# Patient Record
Sex: FEMALE | Race: BLACK OR AFRICAN AMERICAN | NOT HISPANIC OR LATINO | Employment: OTHER | URBAN - METROPOLITAN AREA
[De-identification: names, ages, dates, MRNs, and addresses within clinical notes are randomized per-mention and may not be internally consistent; named-entity substitution may affect disease eponyms.]

---

## 2021-03-30 ENCOUNTER — HISTORICAL (OUTPATIENT)
Dept: ADMINISTRATIVE | Facility: HOSPITAL | Age: 81
End: 2021-03-30

## 2021-03-30 LAB
APPEARANCE, UA: ABNORMAL
BACTERIA SPEC CULT: ABNORMAL /HPF
BILIRUB UR QL STRIP: NEGATIVE
COLOR UR: YELLOW
GLUCOSE (UA): ABNORMAL
HGB UR QL STRIP: NEGATIVE
KETONES UR QL STRIP: NEGATIVE
LEUKOCYTE ESTERASE UR QL STRIP: ABNORMAL
NITRITE UR QL STRIP: NEGATIVE
PH UR STRIP: 7 [PH] (ref 5–9)
PROT UR QL STRIP: ABNORMAL
RBC #/AREA URNS HPF: ABNORMAL /[HPF]
SP GR UR STRIP: 1.02 (ref 1–1.03)
SQUAMOUS EPITHELIAL, UA: ABNORMAL /HPF (ref 0–4)
UROBILINOGEN UR STRIP-ACNC: 0.2
WBC #/AREA URNS HPF: 299 /HPF (ref 0–3)

## 2021-04-01 LAB — FINAL CULTURE: NORMAL

## 2021-04-12 ENCOUNTER — HISTORICAL (OUTPATIENT)
Dept: LAB | Facility: HOSPITAL | Age: 81
End: 2021-04-12

## 2021-04-12 LAB
ABS NEUT (OLG): 6.22 X10(3)/MCL (ref 2.1–9.2)
ALBUMIN SERPL-MCNC: 4 GM/DL (ref 3.4–4.8)
ALBUMIN/GLOB SERPL: 1.3 RATIO (ref 1.1–2)
ALP SERPL-CCNC: 72 UNIT/L (ref 40–150)
ALT SERPL-CCNC: 12 UNIT/L (ref 0–55)
APPEARANCE, UA: ABNORMAL
AST SERPL-CCNC: 10 UNIT/L (ref 5–34)
BACTERIA SPEC CULT: ABNORMAL /HPF
BASOPHILS # BLD AUTO: 0.04 X10(3)/MCL (ref 0–0.2)
BASOPHILS NFR BLD AUTO: 0.5 % (ref 0–1)
BILIRUB SERPL-MCNC: 0.4 MG/DL (ref 0.2–1.2)
BILIRUB UR QL STRIP: NEGATIVE
BILIRUBIN DIRECT+TOT PNL SERPL-MCNC: 0.2 MG/DL (ref 0–0.5)
BILIRUBIN DIRECT+TOT PNL SERPL-MCNC: 0.2 MG/DL (ref 0–0.8)
BUN SERPL-MCNC: 71.1 MG/DL (ref 9.8–20.1)
CALCIUM SERPL-MCNC: 10.5 MG/DL (ref 8.4–10.2)
CHLORIDE SERPL-SCNC: 108 MMOL/L (ref 98–107)
CHOLEST SERPL-MCNC: 207 MG/DL
CHOLEST/HDLC SERPL: 5 {RATIO} (ref 0–5)
CO2 SERPL-SCNC: 24 MMOL/L (ref 23–31)
COLOR UR: YELLOW
CREAT SERPL-MCNC: 3.36 MG/DL (ref 0.57–1.11)
CREAT UR-MCNC: 169.8 MG/DL (ref 45–106)
EOSINOPHIL # BLD AUTO: 0.14 X10(3)/MCL (ref 0–0.9)
EOSINOPHIL NFR BLD AUTO: 1.7 % (ref 0–6.4)
ERYTHROCYTE [DISTWIDTH] IN BLOOD BY AUTOMATED COUNT: 15.1 % (ref 11.5–17)
EST. AVERAGE GLUCOSE BLD GHB EST-MCNC: 177.2 MG/DL
GLOBULIN SER-MCNC: 3 GM/DL (ref 2.4–3.5)
GLUCOSE (UA): NEGATIVE
GLUCOSE SERPL-MCNC: 194 MG/DL (ref 82–115)
HBA1C MFR BLD: 7.8 %
HCT VFR BLD AUTO: 35.1 % (ref 37–47)
HDLC SERPL-MCNC: 44 MG/DL (ref 40–60)
HGB BLD-MCNC: 11.3 GM/DL (ref 12–16)
HGB UR QL STRIP: NEGATIVE
IMM GRANULOCYTES # BLD AUTO: 0.05 10*3/UL (ref 0–0.02)
IMM GRANULOCYTES NFR BLD AUTO: 0.6 % (ref 0–0.43)
KETONES UR QL STRIP: NEGATIVE
LDLC SERPL CALC-MCNC: 129 MG/DL (ref 50–140)
LEUKOCYTE ESTERASE UR QL STRIP: ABNORMAL
LYMPHOCYTES # BLD AUTO: 1.08 X10(3)/MCL (ref 0.6–4.6)
LYMPHOCYTES NFR BLD AUTO: 13.4 % (ref 16–44)
MCH RBC QN AUTO: 28.7 PG (ref 27–31)
MCHC RBC AUTO-ENTMCNC: 32.2 GM/DL (ref 33–36)
MCV RBC AUTO: 89.1 FL (ref 80–94)
MICROALBUMIN UR-MCNC: 51.4 UG/ML
MICROALBUMIN/CREAT RATIO PNL UR: 30.3 MG/GM CR (ref 0–30)
MONOCYTES # BLD AUTO: 0.51 X10(3)/MCL (ref 0.1–1.3)
MONOCYTES NFR BLD AUTO: 6.3 % (ref 4–12.1)
NEUTROPHILS # BLD AUTO: 6.22 X10(3)/MCL (ref 2.1–9.2)
NEUTROPHILS NFR BLD AUTO: 77.5 % (ref 43–73)
NITRITE UR QL STRIP: NEGATIVE
NRBC BLD AUTO-RTO: 0 % (ref 0–0.2)
PH UR STRIP: 6 [PH] (ref 5–7)
PLATELET # BLD AUTO: 289 X10(3)/MCL (ref 130–400)
PMV BLD AUTO: 10.2 FL (ref 7.4–10.4)
POTASSIUM SERPL-SCNC: 5.2 MMOL/L (ref 3.5–5.1)
PROT SERPL-MCNC: 7 GM/DL (ref 5.8–7.6)
PROT UR QL STRIP: NEGATIVE
RBC # BLD AUTO: 3.94 X10(6)/MCL (ref 4.2–5.4)
RBC #/AREA URNS HPF: 0 /[HPF]
SODIUM SERPL-SCNC: 141 MMOL/L (ref 136–145)
SP GR UR STRIP: 1.02 (ref 1–1.03)
SQUAMOUS EPITHELIAL, UA: ABNORMAL /LPF
TRIGL SERPL-MCNC: 169 MG/DL (ref 0–150)
TSH SERPL-ACNC: 4.52 UIU/ML (ref 0.35–4.94)
UROBILINOGEN UR STRIP-ACNC: NEGATIVE
VLDLC SERPL CALC-MCNC: 34 MG/DL
WBC # SPEC AUTO: 8 X10(3)/MCL (ref 4.5–11.5)
WBC #/AREA URNS HPF: ABNORMAL /HPF

## 2021-04-21 ENCOUNTER — HISTORICAL (OUTPATIENT)
Dept: RADIOLOGY | Facility: HOSPITAL | Age: 81
End: 2021-04-21

## 2021-12-28 ENCOUNTER — HISTORICAL (OUTPATIENT)
Dept: LAB | Facility: HOSPITAL | Age: 81
End: 2021-12-28

## 2021-12-28 LAB
ABS NEUT (OLG): 6.63 X10(3)/MCL (ref 2.1–9.2)
ALBUMIN SERPL-MCNC: 3.8 GM/DL (ref 3.4–4.8)
ALBUMIN/GLOB SERPL: 1.3 RATIO (ref 1.1–2)
ALP SERPL-CCNC: 57 UNIT/L (ref 40–150)
ALT SERPL-CCNC: 14 UNIT/L (ref 0–55)
APPEARANCE, UA: ABNORMAL
AST SERPL-CCNC: 11 UNIT/L (ref 5–34)
BACTERIA SPEC CULT: ABNORMAL /HPF
BASOPHILS # BLD AUTO: 0.03 X10(3)/MCL (ref 0–0.2)
BASOPHILS NFR BLD AUTO: 0.3 % (ref 0–1)
BILIRUB SERPL-MCNC: 0.4 MG/DL (ref 0.2–1.2)
BILIRUB UR QL STRIP: NEGATIVE
BILIRUBIN DIRECT+TOT PNL SERPL-MCNC: 0.2 MG/DL (ref 0–0.5)
BILIRUBIN DIRECT+TOT PNL SERPL-MCNC: 0.2 MG/DL (ref 0–0.8)
BUN SERPL-MCNC: 48.2 MG/DL (ref 9.8–20.1)
CALCIUM SERPL-MCNC: 10.2 MG/DL (ref 8.4–10.2)
CHLORIDE SERPL-SCNC: 108 MMOL/L (ref 98–107)
CHOLEST SERPL-MCNC: 192 MG/DL
CHOLEST/HDLC SERPL: 4 {RATIO} (ref 0–5)
CO2 SERPL-SCNC: 26 MMOL/L (ref 23–31)
COLOR UR: YELLOW
CREAT SERPL-MCNC: 2.34 MG/DL (ref 0.57–1.11)
EOSINOPHIL # BLD AUTO: 0.11 X10(3)/MCL (ref 0–0.9)
EOSINOPHIL NFR BLD AUTO: 1.3 % (ref 0–6.4)
ERYTHROCYTE [DISTWIDTH] IN BLOOD BY AUTOMATED COUNT: 15.9 % (ref 11.5–17)
EST. AVERAGE GLUCOSE BLD GHB EST-MCNC: 159.9 MG/DL
GLOBULIN SER-MCNC: 3 GM/DL (ref 2.4–3.5)
GLUCOSE (UA): NEGATIVE
GLUCOSE SERPL-MCNC: 156 MG/DL (ref 82–115)
HBA1C MFR BLD: 7.2 %
HCT VFR BLD AUTO: 33.7 % (ref 37–47)
HDLC SERPL-MCNC: 45 MG/DL (ref 40–60)
HGB BLD-MCNC: 10.6 GM/DL (ref 12–16)
HGB UR QL STRIP: NEGATIVE
IMM GRANULOCYTES # BLD AUTO: 0.05 10*3/UL (ref 0–0.02)
IMM GRANULOCYTES NFR BLD AUTO: 0.6 % (ref 0–0.43)
KETONES UR QL STRIP: NEGATIVE
LDLC SERPL CALC-MCNC: 127 MG/DL (ref 50–140)
LEUKOCYTE ESTERASE UR QL STRIP: ABNORMAL
LYMPHOCYTES # BLD AUTO: 1.22 X10(3)/MCL (ref 0.6–4.6)
LYMPHOCYTES NFR BLD AUTO: 14.2 % (ref 16–44)
MCH RBC QN AUTO: 28.6 PG (ref 27–31)
MCHC RBC AUTO-ENTMCNC: 31.5 GM/DL (ref 33–36)
MCV RBC AUTO: 90.8 FL (ref 80–94)
MONOCYTES # BLD AUTO: 0.57 X10(3)/MCL (ref 0.1–1.3)
MONOCYTES NFR BLD AUTO: 6.6 % (ref 4–12.1)
NEUTROPHILS # BLD AUTO: 6.63 X10(3)/MCL (ref 2.1–9.2)
NEUTROPHILS NFR BLD AUTO: 77 % (ref 43–73)
NITRITE UR QL STRIP: NEGATIVE
NRBC BLD AUTO-RTO: 0 % (ref 0–0.2)
PH UR STRIP: 5.5 [PH] (ref 5–7)
PLATELET # BLD AUTO: 270 X10(3)/MCL (ref 130–400)
PMV BLD AUTO: 10.8 FL (ref 7.4–10.4)
POTASSIUM SERPL-SCNC: 4.6 MMOL/L (ref 3.5–5.1)
PROT SERPL-MCNC: 6.8 GM/DL (ref 5.8–7.6)
PROT UR QL STRIP: NEGATIVE
RBC # BLD AUTO: 3.71 X10(6)/MCL (ref 4.2–5.4)
RBC #/AREA URNS HPF: 0 /[HPF]
SODIUM SERPL-SCNC: 143 MMOL/L (ref 136–145)
SP GR UR STRIP: 1.02 (ref 1–1.03)
SQUAMOUS EPITHELIAL, UA: ABNORMAL /LPF
TRIGL SERPL-MCNC: 102 MG/DL (ref 0–150)
UROBILINOGEN UR STRIP-ACNC: NEGATIVE
VLDLC SERPL CALC-MCNC: 20 MG/DL
WBC # SPEC AUTO: 8.6 X10(3)/MCL (ref 4.5–11.5)
WBC #/AREA URNS HPF: ABNORMAL /HPF

## 2022-04-11 ENCOUNTER — HISTORICAL (OUTPATIENT)
Dept: ADMINISTRATIVE | Facility: HOSPITAL | Age: 82
End: 2022-04-11

## 2022-04-29 VITALS
HEIGHT: 57 IN | OXYGEN SATURATION: 97 % | WEIGHT: 216 LBS | DIASTOLIC BLOOD PRESSURE: 79 MMHG | SYSTOLIC BLOOD PRESSURE: 138 MMHG | BODY MASS INDEX: 46.6 KG/M2

## 2022-06-16 ENCOUNTER — OFFICE VISIT (OUTPATIENT)
Dept: FAMILY MEDICINE | Facility: CLINIC | Age: 82
End: 2022-06-16
Payer: MEDICARE

## 2022-06-16 VITALS
SYSTOLIC BLOOD PRESSURE: 107 MMHG | DIASTOLIC BLOOD PRESSURE: 67 MMHG | HEART RATE: 84 BPM | OXYGEN SATURATION: 99 % | RESPIRATION RATE: 18 BRPM | BODY MASS INDEX: 45.74 KG/M2 | HEIGHT: 57 IN | TEMPERATURE: 99 F | WEIGHT: 212 LBS

## 2022-06-16 DIAGNOSIS — Z74.8 DEPENDENT FOR TRANSPORTATION: ICD-10-CM

## 2022-06-16 DIAGNOSIS — E11.69 MIXED DIABETIC HYPERLIPIDEMIA ASSOCIATED WITH TYPE 2 DIABETES MELLITUS: Chronic | ICD-10-CM

## 2022-06-16 DIAGNOSIS — E03.9 ACQUIRED HYPOTHYROIDISM: Chronic | ICD-10-CM

## 2022-06-16 DIAGNOSIS — E11.65 TYPE 2 DIABETES MELLITUS WITH HYPERGLYCEMIA, WITH LONG-TERM CURRENT USE OF INSULIN: Primary | ICD-10-CM

## 2022-06-16 DIAGNOSIS — E78.2 MIXED DIABETIC HYPERLIPIDEMIA ASSOCIATED WITH TYPE 2 DIABETES MELLITUS: Chronic | ICD-10-CM

## 2022-06-16 DIAGNOSIS — M15.9 PRIMARY OSTEOARTHRITIS INVOLVING MULTIPLE JOINTS: ICD-10-CM

## 2022-06-16 DIAGNOSIS — Z79.4 TYPE 2 DIABETES MELLITUS WITH HYPERGLYCEMIA, WITH LONG-TERM CURRENT USE OF INSULIN: Primary | ICD-10-CM

## 2022-06-16 DIAGNOSIS — N18.4 CHRONIC KIDNEY DISEASE, STAGE 4 (SEVERE): ICD-10-CM

## 2022-06-16 DIAGNOSIS — Z79.4 LONG TERM CURRENT USE OF INSULIN: Chronic | ICD-10-CM

## 2022-06-16 DIAGNOSIS — E66.01 MORBID OBESITY: ICD-10-CM

## 2022-06-16 DIAGNOSIS — E11.22 TYPE 2 DIABETES MELLITUS WITH STAGE 4 CHRONIC KIDNEY DISEASE, WITH LONG-TERM CURRENT USE OF INSULIN: Chronic | ICD-10-CM

## 2022-06-16 DIAGNOSIS — Z79.4 TYPE 2 DIABETES MELLITUS WITH STAGE 4 CHRONIC KIDNEY DISEASE, WITH LONG-TERM CURRENT USE OF INSULIN: Chronic | ICD-10-CM

## 2022-06-16 DIAGNOSIS — N18.4 TYPE 2 DIABETES MELLITUS WITH STAGE 4 CHRONIC KIDNEY DISEASE, WITH LONG-TERM CURRENT USE OF INSULIN: Chronic | ICD-10-CM

## 2022-06-16 DIAGNOSIS — I10 PRIMARY HYPERTENSION: Chronic | ICD-10-CM

## 2022-06-16 DIAGNOSIS — E78.2 MIXED HYPERLIPIDEMIA: Chronic | ICD-10-CM

## 2022-06-16 DIAGNOSIS — Z99.89 DEPENDENT ON WALKER FOR AMBULATION: ICD-10-CM

## 2022-06-16 PROBLEM — Z00.01 ABNORMAL PHYSICAL EVALUATION: Status: ACTIVE | Noted: 2022-06-16

## 2022-06-16 PROBLEM — M19.90 OSTEOARTHRITIS: Status: ACTIVE | Noted: 2022-06-16

## 2022-06-16 PROBLEM — Z23 NEED FOR IMMUNIZATION AGAINST INFLUENZA: Status: ACTIVE | Noted: 2022-06-16

## 2022-06-16 PROBLEM — Z13.820 OSTEOPOROSIS SCREENING: Status: ACTIVE | Noted: 2022-06-16

## 2022-06-16 PROCEDURE — 99214 PR OFFICE/OUTPT VISIT, EST, LEVL IV, 30-39 MIN: ICD-10-PCS | Mod: ,,, | Performed by: FAMILY MEDICINE

## 2022-06-16 PROCEDURE — 99214 OFFICE O/P EST MOD 30 MIN: CPT | Mod: ,,, | Performed by: FAMILY MEDICINE

## 2022-06-16 RX ORDER — PEN NEEDLE, DIABETIC 32GX 5/32"
NEEDLE, DISPOSABLE MISCELLANEOUS
COMMUNITY
Start: 2022-05-18

## 2022-06-16 RX ORDER — CALCITRIOL 0.25 UG/1
0.25 CAPSULE ORAL DAILY
COMMUNITY
Start: 2022-05-19

## 2022-06-16 RX ORDER — TRAMADOL HYDROCHLORIDE 50 MG/1
50 TABLET ORAL EVERY 12 HOURS
COMMUNITY
Start: 2022-02-08 | End: 2022-08-07

## 2022-06-16 RX ORDER — INSULIN DEGLUDEC 100 U/ML
30 INJECTION, SOLUTION SUBCUTANEOUS DAILY
COMMUNITY
Start: 2021-12-06 | End: 2022-09-20

## 2022-06-16 RX ORDER — METHIMAZOLE 10 MG/1
TABLET ORAL
COMMUNITY
Start: 2022-04-03

## 2022-06-16 RX ORDER — GLIMEPIRIDE 4 MG/1
4 TABLET ORAL 2 TIMES DAILY
COMMUNITY
Start: 2022-05-24 | End: 2022-06-16

## 2022-06-16 RX ORDER — DULAGLUTIDE 1.5 MG/.5ML
1.5 INJECTION, SOLUTION SUBCUTANEOUS WEEKLY
COMMUNITY
Start: 2022-06-03 | End: 2023-03-23 | Stop reason: SDUPTHER

## 2022-06-16 RX ORDER — PRAVASTATIN SODIUM 20 MG/1
20 TABLET ORAL DAILY
COMMUNITY
Start: 2022-05-19

## 2022-06-16 RX ORDER — LISINOPRIL AND HYDROCHLOROTHIAZIDE 10; 12.5 MG/1; MG/1
1 TABLET ORAL DAILY
COMMUNITY
Start: 2022-03-06 | End: 2022-06-16 | Stop reason: SDUPTHER

## 2022-06-16 RX ORDER — DOCUSATE SODIUM 100 MG/1
1 CAPSULE, LIQUID FILLED ORAL DAILY
COMMUNITY
Start: 2021-12-06

## 2022-06-16 RX ORDER — LISINOPRIL AND HYDROCHLOROTHIAZIDE 10; 12.5 MG/1; MG/1
1 TABLET ORAL DAILY
Qty: 90 TABLET | Refills: 1 | Status: SHIPPED | OUTPATIENT
Start: 2022-06-16 | End: 2022-09-20

## 2022-06-16 RX ORDER — AMLODIPINE BESYLATE 10 MG/1
10 TABLET ORAL DAILY
COMMUNITY
Start: 2022-05-19 | End: 2023-03-23 | Stop reason: SDUPTHER

## 2022-06-16 RX ORDER — LABETALOL 300 MG/1
300 TABLET, FILM COATED ORAL 2 TIMES DAILY
COMMUNITY
Start: 2021-12-06

## 2022-06-16 NOTE — PROGRESS NOTES
Subjective:      Patient ID: Anne Alberts is a 82 y.o. female.    Chief Complaint: Follow-up (Needs med refills. Had some episodes hypoglycemia requiring emergency care via EMS giving IV Dextrose. //New med from Dr. Maddy Pastor. (Before episodes of hypoglyemia.))    Problem List Items Addressed This Visit     Type 2 diabetes mellitus with hyperglycemia, with long-term current use of insulin - Primary (Chronic)    Relevant Medications    TRULICITY 1.5 mg/0.5 mL pen injector    glimepiride (AMARYL) 4 MG tablet    insulin degludec (TRESIBA FLEXTOUCH U-100) 100 unit/mL (3 mL) insulin pen    Chronic kidney disease, stage 4 (severe) (Chronic)    Hypertension (Chronic)    Long term current use of insulin (Chronic)    Mixed hyperlipidemia (Chronic)    Type 2 diabetes mellitus with stage 4 chronic kidney disease, with long-term current use of insulin (Chronic)    Relevant Medications    TRULICITY 1.5 mg/0.5 mL pen injector    glimepiride (AMARYL) 4 MG tablet    insulin degludec (TRESIBA FLEXTOUCH U-100) 100 unit/mL (3 mL) insulin pen    BMI 45.0-49.9, adult (Chronic)    Mixed diabetic hyperlipidemia associated with type 2 diabetes mellitus (Chronic)    Relevant Medications    TRULICITY 1.5 mg/0.5 mL pen injector    glimepiride (AMARYL) 4 MG tablet    insulin degludec (TRESIBA FLEXTOUCH U-100) 100 unit/mL (3 mL) insulin pen    Hypothyroidism (Chronic)    Dependent on walker for ambulation    Morbid obesity    Osteoarthritis      Other Visit Diagnoses     Dependent for transportation              The patient's Health Maintenance was reviewed and the following appears to be due:   Health Maintenance Due   Topic Date Due    Foot Exam  Never done    TETANUS VACCINE  Never done    Eye Exam  08/23/2019    Shingles Vaccine (2 of 2) 09/15/2019    COVID-19 Vaccine (4 - Booster for Pfizer series) 02/15/2022    Diabetes Urine Screening  04/12/2022       Past Medical History:  Past Medical History:   Diagnosis Date     "Diabetes mellitus, type 2     Disorder of kidney and ureter     Hypertension     Hypothyroidism      Past Surgical History:   Procedure Laterality Date    APPENDECTOMY      BREAST BIOPSY       SECTION       Review of patient's allergies indicates:   Allergen Reactions    Aspirin      Other reaction(s): Bleeding    Codeine Rash     Other reaction(s): Codeine     Current Outpatient Medications on File Prior to Visit   Medication Sig Dispense Refill    docusate sodium (COLACE) 100 MG capsule Take 1 capsule by mouth once daily.      insulin degludec (TRESIBA FLEXTOUCH U-100) 100 unit/mL (3 mL) insulin pen Inject 30 Units into the skin once daily.      labetaloL (NORMODYNE) 300 MG tablet Take 300 mg by mouth 2 (two) times a day.      traMADoL (ULTRAM) 50 mg tablet Take 50 mg by mouth every 12 (twelve) hours.      amLODIPine (NORVASC) 10 MG tablet Take 10 mg by mouth once daily.      BD BARBIE 2ND GEN PEN NEEDLE 32 gauge x 5/32" Ndle USE ONE NEEDLE SUBCUTANEOUSLY ONCE DAILY TO ADMINISTER TRESIBA 90      calcitRIOL (ROCALTROL) 0.25 MCG Cap Take 0.25 mcg by mouth once daily.      glimepiride (AMARYL) 4 MG tablet Take 4 mg by mouth 2 (two) times daily.      lisinopriL-hydrochlorothiazide (PRINZIDE,ZESTORETIC) 10-12.5 mg per tablet Take 1 tablet by mouth once daily.      methIMAzole (TAPAZOLE) 10 MG Tab TAKE 1 TABLET BY MOUTH WITH FOOD EVERY OTHER DAY ALTERNATING WITH 1/2 TABLET      pravastatin (PRAVACHOL) 20 MG tablet Take 20 mg by mouth once daily.      TRULICITY 1.5 mg/0.5 mL pen injector Inject 1.5 mg into the skin once a week.       No current facility-administered medications on file prior to visit.     Social History     Socioeconomic History    Marital status:     Number of children: 3   Occupational History    Occupation: Retired   Tobacco Use    Smoking status: Former Smoker    Smokeless tobacco: Never Used   Substance and Sexual Activity    Alcohol use: Not Currently    Drug " "use: Not Currently     Social Determinants of Health     Transportation Needs: Unmet Transportation Needs    Lack of Transportation (Medical): Yes    Lack of Transportation (Non-Medical): Yes     Family History   Problem Relation Age of Onset    Heart disease Mother     Kidney disease Father     Stroke Father     Hypertension Father     Diabetes Father        Review of Systems    Objective:   /67 (BP Location: Right arm, Patient Position: Sitting, BP Method: X-Large (Automatic))   Pulse 84   Temp 98.8 °F (37.1 °C) (Oral)   Resp 18   Ht 4' 9" (1.448 m)   Wt 96.2 kg (212 lb)   SpO2 99%   BMI 45.88 kg/m²     Physical Exam  Vitals and nursing note reviewed.   Constitutional:       Appearance: Normal appearance. She is obese.   HENT:      Head: Normocephalic and atraumatic.      Right Ear: Tympanic membrane, ear canal and external ear normal.      Left Ear: Tympanic membrane, ear canal and external ear normal.      Nose: Nose normal.      Mouth/Throat:      Mouth: Mucous membranes are moist.      Pharynx: Oropharynx is clear.   Eyes:      Extraocular Movements: Extraocular movements intact.      Conjunctiva/sclera: Conjunctivae normal.      Pupils: Pupils are equal, round, and reactive to light.   Cardiovascular:      Rate and Rhythm: Normal rate and regular rhythm.      Pulses:           Dorsalis pedis pulses are 1+ on the right side and 1+ on the left side.        Posterior tibial pulses are 1+ on the right side and 1+ on the left side.      Heart sounds: Normal heart sounds.   Pulmonary:      Effort: Pulmonary effort is normal.      Breath sounds: Normal breath sounds.   Abdominal:      General: Abdomen is flat. Bowel sounds are normal.      Palpations: Abdomen is soft.   Musculoskeletal:         General: Normal range of motion.      Cervical back: Normal range of motion and neck supple.      Right foot: No deformity.      Left foot: No deformity.      Comments: Ambulates with help of rollator " walker   Feet:      Right foot:      Protective Sensation: 6 sites tested. 6 sites sensed.      Skin integrity: Skin integrity normal.      Toenail Condition: Right toenails are normal.      Left foot:      Protective Sensation: 6 sites tested. 6 sites sensed.      Skin integrity: Skin integrity normal.      Toenail Condition: Left toenails are normal.   Skin:     General: Skin is warm and dry.      Capillary Refill: Capillary refill takes 2 to 3 seconds.   Neurological:      General: No focal deficit present.      Mental Status: She is alert and oriented to person, place, and time. Mental status is at baseline.   Psychiatric:         Mood and Affect: Mood normal.         Behavior: Behavior normal.         Thought Content: Thought content normal.         Judgment: Judgment normal.         Historical on 12/28/2021   Component Date Value Ref Range Status    Alanine Aminotransferase 12/28/2021 14  0 - 55 unit/L Final    Albumin/Globulin Ratio 12/28/2021 1.3  1.1 - 2.0 ratio Final    Aspartate Aminotransferase 12/28/2021 11  5 - 34 unit/L Final    Albumin Level 12/28/2021 3.8  3.4 - 4.8 gm/dL Final    Alkaline Phosphatase 12/28/2021 57  40 - 150 unit/L Final    Blood Urea Nitrogen 12/28/2021 48.2 (A) 9.8 - 20.1 mg/dL Final    Bilirubin Indirect 12/28/2021 0.20  0.00 - 0.80 mg/dL Final    Bilirubin Direct 12/28/2021 0.2  0.0 - 0.5 mg/dL Final    Bilirubin Total 12/28/2021 0.4  0.2 - 1.2 mg/dL Final    Calcium Level Total 12/28/2021 10.2  8.4 - 10.2 mg/dL Final    Carbon Dioxide 12/28/2021 26  23 - 31 mmol/L Final    Chloride 12/28/2021 108 (A) 98 - 107 mmol/L Final    Creatinine 12/28/2021 2.34 (A) 0.57 - 1.11 mg/dL Final    Glucose Level 12/28/2021 156 (A) 82 - 115 mg/dL Final    Globulin 12/28/2021 3.0  2.4 - 3.5 gm/dL Final    Potassium Level 12/28/2021 4.6  3.5 - 5.1 mmol/L Final    Sodium Level 12/28/2021 143  136 - 145 mmol/L Final    Protein Total 12/28/2021 6.8  5.8 - 7.6 gm/dL Final     Cholesterol Total 12/28/2021 192  <<=200 mg/dL Final    Cholesterol/HDL Ratio 12/28/2021 4  0 - 5 Final    HDL Cholesterol 12/28/2021 45  40 - 60 mg/dL Final    LDL Cholesterol 12/28/2021 127.00  50.00 - 140.00 mg/dL Final    Triglyceride 12/28/2021 102  0 - 150 mg/dL Final    Very Low Density Lipoprotein 12/28/2021 20   Final    Estimated Average Glucose 12/28/2021 159.9  mg/dL Final    Hemoglobin A1c 12/28/2021 7.2 (A) <<=7.0 % Final    Estimated GFR-Non  12/28/2021 21  mL/min/1.73 m2 Final    Estimated GFR- 12/28/2021 26   Final    Lymph # 12/28/2021 1.22  0.60 - 4.60 x10(3)/mcL Final    Basophil % 12/28/2021 0.3  0.0 - 1.0 % Final    Mono # 12/28/2021 0.57  0.10 - 1.30 x10(3)/mcL Final    Baso # 12/28/2021 0.03  0.00 - 0.20 x10(3)/mcL Final    Eos # 12/28/2021 0.11  0.00 - 0.90 x10(3)/mcL Final    Neut # 12/28/2021 6.63  2.10 - 9.20 x10(3)/mcL Final    Lymph % 12/28/2021 14.2 (A) 16.0 - 44.0 % Final    Eos % 12/28/2021 1.3  0.0 - 6.4 % Final    Mono % 12/28/2021 6.6  4.0 - 12.1 % Final    IG# 12/28/2021 0.0500 (A) 0.0000 - 0.0155 Final    IG% 12/28/2021 0.600 (A) 0.000 - 0.430 % Final    Neut % 12/28/2021 77.0 (A) 43.0 - 73.0 % Final    NRBC% 12/28/2021 0.0  0.0 - 0.2 % Final    Abs Neut 12/28/2021 6.63  2.10 - 9.20 x10(3)/mcL Final    Hgb 12/28/2021 10.6 (A) 12.0 - 16.0 gm/dL Final    Hct 12/28/2021 33.7 (A) 37.0 - 47.0 % Final    MCV 12/28/2021 90.8  80.0 - 94.0 fL Final    MPV 12/28/2021 10.8 (A) 7.4 - 10.4 fL Final    MCH 12/28/2021 28.6  27.0 - 31.0 pg Final    MCHC 12/28/2021 31.5 (A) 33.0 - 36.0 gm/dL Final    RDW 12/28/2021 15.9  11.5 - 17.0 % Final    Platelet 12/28/2021 270  130 - 400 x10(3)/mcL Final    WBC 12/28/2021 8.6  4.5 - 11.5 x10(3)/mcL Final    RBC 12/28/2021 3.71 (A) 4.20 - 5.40 x10(6)/mcL Final    Nitrite, UA 12/28/2021 Negative  >Negative Final    Occult Blood UA 12/28/2021 Negative  >Negative Final    Protein, UA  12/28/2021 Negative  >Negative Final    Bilirubin (UA) 12/28/2021 Negative  >Negative Final    pH, UA 12/28/2021 5.5  5.0 - 7.0 Final    Glucose, UA 12/28/2021 Negative  >Negative Final    Ketones, UA 12/28/2021 Negative  >Negative Final    Color, UA 12/28/2021 YELLOW  >YELLOW Final    Leukocytes, UA 12/28/2021 1+ (A) >Negative Final    Appearance, UA 12/28/2021 HAZY  >CLEAR Final    Urobilinogen, UA 12/28/2021 Negative  >Negative Final    Specific Gravity,UA 12/28/2021 1.020  1.005 - 1.030 Final    WBC, UA 12/28/2021 20-50 (A) >2 - 5 /HPF Final    RBC, UA 12/28/2021 0  >2 - 5 Final    Bacteria 12/28/2021 Moderate (A) >None Seen /HPF Final    Squam Epithel, UA 12/28/2021 Few  /LPF Final       X-Ray Chest PA And Lateral     EXAM: XR Chest 1 View     INDICATION: Shortness of Breath     TECHNIQUE: Frontal view of the chest is obtained.     COMPARISON: None     FINDINGS: The cardiomediastinal silhouette is normal in size and  contour. The thoracic aorta is atherosclerotic. There is mild  elevation of the left hemidiaphragm without focal consolidation,  pleural effusion or pneumothorax.        IMPRESSION:   No acute cardiopulmonary process identified.        Electronically Signed By: Anushka Reeves DO  Date/Time Signed: 10/26/2018 09:00       Assessment:     1. Type 2 diabetes mellitus with hyperglycemia, with long-term current use of insulin    2. Type 2 diabetes mellitus with stage 4 chronic kidney disease, with long-term current use of insulin    3. Mixed diabetic hyperlipidemia associated with type 2 diabetes mellitus    4. Long term current use of insulin    5. Mixed hyperlipidemia    6. Primary hypertension    7. Chronic kidney disease, stage 4 (severe)    8. Primary osteoarthritis involving multiple joints    9. Acquired hypothyroidism    10. Morbid obesity    11. BMI 45.0-49.9, adult    12. Dependent for transportation    13. Dependent on walker for ambulation      Plan:   I am having Anne NORIEGA  Aristeo maintain her amLODIPine, calcitRIOL, TRULICITY, docusate sodium, glimepiride, TRESIBA FLEXTOUCH U-100, labetaloL, lisinopriL-hydrochlorothiazide, methIMAzole, BD BARBIE 2ND GEN PEN NEEDLE, pravastatin, and traMADoL.  Problem List Items Addressed This Visit     Type 2 diabetes mellitus with hyperglycemia, with long-term current use of insulin - Primary (Chronic)    Relevant Medications    TRULICITY 1.5 mg/0.5 mL pen injector    glimepiride (AMARYL) 4 MG tablet    insulin degludec (TRESIBA FLEXTOUCH U-100) 100 unit/mL (3 mL) insulin pen    Chronic kidney disease, stage 4 (severe) (Chronic)    Hypertension (Chronic)    Long term current use of insulin (Chronic)    Mixed hyperlipidemia (Chronic)    Type 2 diabetes mellitus with stage 4 chronic kidney disease, with long-term current use of insulin (Chronic)    Relevant Medications    TRULICITY 1.5 mg/0.5 mL pen injector    glimepiride (AMARYL) 4 MG tablet    insulin degludec (TRESIBA FLEXTOUCH U-100) 100 unit/mL (3 mL) insulin pen    BMI 45.0-49.9, adult (Chronic)    Mixed diabetic hyperlipidemia associated with type 2 diabetes mellitus (Chronic)    Relevant Medications    TRULICITY 1.5 mg/0.5 mL pen injector    glimepiride (AMARYL) 4 MG tablet    insulin degludec (TRESIBA FLEXTOUCH U-100) 100 unit/mL (3 mL) insulin pen    Hypothyroidism (Chronic)    Dependent on walker for ambulation    Morbid obesity    Osteoarthritis      Other Visit Diagnoses     Dependent for transportation            Follow up in about 6 months (around 12/16/2022).    Anne was seen today for follow-up.    Diagnoses and all orders for this visit:    Type 2 diabetes mellitus with hyperglycemia, with long-term current use of insulin   Continue current prescription medications. Refills as needed   Condition/Symptoms controlled, Sees endocrine for this. Request records for our metrics   RTC 6 months (as scheduled) or PRN    Type 2 diabetes mellitus with stage 4 chronic kidney disease, with  long-term current use of insulin   Continue current prescription medications. Refills as needed   Condition/Symptoms controlled, Sees endocrine for this. Request records for our metrics   RTC 6 months (as scheduled) or PRN    Mixed diabetic hyperlipidemia associated with type 2 diabetes mellitus   Continue current prescription medications. Refills as needed   Condition/Symptoms controlled, Sees endocrine for this. Request records for our metrics   RTC 6 months (as scheduled) or PRN    Long term current use of insulin   Continue current prescription medications. Refills as needed   Condition/Symptoms controlled, Sees endocrine for this. Request records for our metrics   RTC 6 months (as scheduled) or PRN    Mixed hyperlipidemia   Continue current prescription medications. Refills as needed   Condition/Symptoms controlled, Sees endocrine for this. Request records for our metrics   RTC 6 months (as scheduled) or PRN      Primary hypertension  -     lisinopriL-hydrochlorothiazide (PRINZIDE,ZESTORETIC) 10-12.5 mg per tablet; Take 1 tablet by mouth once daily.  - Continue current prescription medications. Refills as needed   Condition/Symptoms controlled   RTC 6 months (as scheduled) or PRN    Chronic kidney disease, stage 4 (severe)   Continue current prescription medications. Refills as needed   Condition/Symptoms controlled, Sees endocrine for this. Request records for our metrics   RTC 6 months (as scheduled) or PRN    Primary osteoarthritis involving multiple joints   Continue current prescription medications. Refills as needed   Condition/Symptoms controlled, Sees endocrine for this. Request records for our metrics   RTC 6 months (as scheduled) or PRN    Acquired hypothyroidism   Continue current prescription medications. Refills as needed   Condition/Symptoms controlled, Sees endocrine for this. Request records for our metrics   RTC 6 months (as scheduled) or PRN    Morbid obesity  Documented for chart completeness  "and HCC    BMI 45.0-49.9, adult  Documented for chart completeness and HCC    Dependent for transportation  Documented for chart completeness and HCC    Dependent on walker for ambulation  Documented for chart completeness and HCC           [unfilled]  No orders of the defined types were placed in this encounter.      Medication List with Changes/Refills   Current Medications    AMLODIPINE (NORVASC) 10 MG TABLET    Take 10 mg by mouth once daily.    BD BARBIE 2ND GEN PEN NEEDLE 32 GAUGE X 5/32" NDLE    USE ONE NEEDLE SUBCUTANEOUSLY ONCE DAILY TO ADMINISTER TRESIBA 90    CALCITRIOL (ROCALTROL) 0.25 MCG CAP    Take 0.25 mcg by mouth once daily.    DOCUSATE SODIUM (COLACE) 100 MG CAPSULE    Take 1 capsule by mouth once daily.    GLIMEPIRIDE (AMARYL) 4 MG TABLET    Take 4 mg by mouth 2 (two) times daily.    INSULIN DEGLUDEC (TRESIBA FLEXTOUCH U-100) 100 UNIT/ML (3 ML) INSULIN PEN    Inject 30 Units into the skin once daily.    LABETALOL (NORMODYNE) 300 MG TABLET    Take 300 mg by mouth 2 (two) times a day.    LISINOPRIL-HYDROCHLOROTHIAZIDE (PRINZIDE,ZESTORETIC) 10-12.5 MG PER TABLET    Take 1 tablet by mouth once daily.    METHIMAZOLE (TAPAZOLE) 10 MG TAB    TAKE 1 TABLET BY MOUTH WITH FOOD EVERY OTHER DAY ALTERNATING WITH 1/2 TABLET    PRAVASTATIN (PRAVACHOL) 20 MG TABLET    Take 20 mg by mouth once daily.    TRAMADOL (ULTRAM) 50 MG TABLET    Take 50 mg by mouth every 12 (twelve) hours.    TRULICITY 1.5 MG/0.5 ML PEN INJECTOR    Inject 1.5 mg into the skin once a week.      Medication List with Changes/Refills   Current Medications    AMLODIPINE (NORVASC) 10 MG TABLET    Take 10 mg by mouth once daily.       Start Date: 5/19/2022 End Date: --    BD BARBIE 2ND GEN PEN NEEDLE 32 GAUGE X 5/32" NDLE    USE ONE NEEDLE SUBCUTANEOUSLY ONCE DAILY TO ADMINISTER TRESIBA 90       Start Date: 5/18/2022 End Date: --    CALCITRIOL (ROCALTROL) 0.25 MCG CAP    Take 0.25 mcg by mouth once daily.       Start Date: 5/19/2022 End Date: --    " DOCUSATE SODIUM (COLACE) 100 MG CAPSULE    Take 1 capsule by mouth once daily.       Start Date: 12/6/2021 End Date: --    GLIMEPIRIDE (AMARYL) 4 MG TABLET    Take 4 mg by mouth 2 (two) times daily.       Start Date: 5/24/2022 End Date: --    INSULIN DEGLUDEC (TRESIBA FLEXTOUCH U-100) 100 UNIT/ML (3 ML) INSULIN PEN    Inject 30 Units into the skin once daily.       Start Date: 12/6/2021 End Date: --    LABETALOL (NORMODYNE) 300 MG TABLET    Take 300 mg by mouth 2 (two) times a day.       Start Date: 12/6/2021 End Date: --    LISINOPRIL-HYDROCHLOROTHIAZIDE (PRINZIDE,ZESTORETIC) 10-12.5 MG PER TABLET    Take 1 tablet by mouth once daily.       Start Date: 3/6/2022  End Date: --    METHIMAZOLE (TAPAZOLE) 10 MG TAB    TAKE 1 TABLET BY MOUTH WITH FOOD EVERY OTHER DAY ALTERNATING WITH 1/2 TABLET       Start Date: 4/3/2022  End Date: --    PRAVASTATIN (PRAVACHOL) 20 MG TABLET    Take 20 mg by mouth once daily.       Start Date: 5/19/2022 End Date: --    TRAMADOL (ULTRAM) 50 MG TABLET    Take 50 mg by mouth every 12 (twelve) hours.       Start Date: 2/8/2022  End Date: 8/7/2022    TRULICITY 1.5 MG/0.5 ML PEN INJECTOR    Inject 1.5 mg into the skin once a week.       Start Date: 6/3/2022  End Date: --

## 2022-08-23 ENCOUNTER — TELEPHONE (OUTPATIENT)
Dept: FAMILY MEDICINE | Facility: CLINIC | Age: 82
End: 2022-08-23
Payer: MEDICARE

## 2022-08-23 RX ORDER — TRAMADOL HYDROCHLORIDE 50 MG/1
50 TABLET ORAL EVERY 12 HOURS PRN
Qty: 60 TABLET | Refills: 0 | Status: SHIPPED | OUTPATIENT
Start: 2022-08-23 | End: 2022-09-22

## 2022-08-23 NOTE — TELEPHONE ENCOUNTER
I have signed for the following orders AND/OR meds.  Please call the patient and ask the patient to schedule the testing AND/OR inform about any medications that were sent.      No orders of the defined types were placed in this encounter.      Medications Ordered This Encounter   Medications    traMADoL (ULTRAM) 50 mg tablet     Sig: Take 1 tablet (50 mg total) by mouth every 12 (twelve) hours as needed for Pain.     Dispense:  60 tablet     Refill:  0     Quantity prescribed more than 7 day supply? Yes, quantity medically necessary     Order Specific Question:   I have reviewed the Prescription Drug Monitoring Program (PDMP) database for this patient prior to prescribing the above opioid medication     Answer:   Yes

## 2022-08-23 NOTE — TELEPHONE ENCOUNTER
----- Message from Trupti Emery sent at 8/23/2022 10:47 AM CDT -----  Regarding: refill  Type:  RX Refill Request    Who Called: pt  Refill or New Rx:refill  RX Name and Strength:tramadol 50 mgs  How is the patient currently taking it? (ex. 1XDay):2 x day  Is this a 30 day or 90 day RX:90  Preferred Pharmacy with phone number:[facility] [place]  Local or Mail Order:local  Ordering Provider:niranjan smiley  Would the patient rather a call back or a response via [facility]? C/b  Best Call Back Number:[phone]  Additional Information:

## 2022-09-19 PROBLEM — Z00.01 ABNORMAL PHYSICAL EVALUATION: Status: RESOLVED | Noted: 2022-06-16 | Resolved: 2022-09-19

## 2022-09-19 PROBLEM — Z13.820 OSTEOPOROSIS SCREENING: Status: RESOLVED | Noted: 2022-06-16 | Resolved: 2022-09-19

## 2022-09-20 DIAGNOSIS — I10 PRIMARY HYPERTENSION: Chronic | ICD-10-CM

## 2022-09-20 RX ORDER — INSULIN DEGLUDEC 100 U/ML
30 INJECTION, SOLUTION SUBCUTANEOUS DAILY
Qty: 9 PEN | Refills: 0 | Status: SHIPPED | OUTPATIENT
Start: 2022-09-20 | End: 2022-12-19

## 2022-09-20 RX ORDER — LISINOPRIL AND HYDROCHLOROTHIAZIDE 10; 12.5 MG/1; MG/1
TABLET ORAL
Qty: 90 TABLET | Refills: 1 | Status: SHIPPED | OUTPATIENT
Start: 2022-09-20 | End: 2023-03-23 | Stop reason: SDUPTHER

## 2023-01-30 ENCOUNTER — OFFICE VISIT (OUTPATIENT)
Dept: FAMILY MEDICINE | Facility: CLINIC | Age: 83
End: 2023-01-30
Payer: MEDICARE

## 2023-01-30 VITALS
HEART RATE: 89 BPM | OXYGEN SATURATION: 99 % | BODY MASS INDEX: 46.17 KG/M2 | WEIGHT: 214 LBS | RESPIRATION RATE: 20 BRPM | TEMPERATURE: 98 F | DIASTOLIC BLOOD PRESSURE: 79 MMHG | HEIGHT: 57 IN | SYSTOLIC BLOOD PRESSURE: 134 MMHG

## 2023-01-30 DIAGNOSIS — E11.69 MIXED DIABETIC HYPERLIPIDEMIA ASSOCIATED WITH TYPE 2 DIABETES MELLITUS: Chronic | ICD-10-CM

## 2023-01-30 DIAGNOSIS — E78.2 MIXED DIABETIC HYPERLIPIDEMIA ASSOCIATED WITH TYPE 2 DIABETES MELLITUS: Chronic | ICD-10-CM

## 2023-01-30 DIAGNOSIS — N18.4 TYPE 2 DIABETES MELLITUS WITH STAGE 4 CHRONIC KIDNEY DISEASE, WITH LONG-TERM CURRENT USE OF INSULIN: Chronic | ICD-10-CM

## 2023-01-30 DIAGNOSIS — Z71.89 ADVANCED DIRECTIVES, COUNSELING/DISCUSSION: ICD-10-CM

## 2023-01-30 DIAGNOSIS — E66.01 MORBID OBESITY: ICD-10-CM

## 2023-01-30 DIAGNOSIS — Z23 FLU VACCINE NEED: ICD-10-CM

## 2023-01-30 DIAGNOSIS — Z79.4 LONG TERM CURRENT USE OF INSULIN: Chronic | ICD-10-CM

## 2023-01-30 DIAGNOSIS — E11.59 HYPERTENSION ASSOCIATED WITH TYPE 2 DIABETES MELLITUS: Chronic | ICD-10-CM

## 2023-01-30 DIAGNOSIS — E11.65 TYPE 2 DIABETES MELLITUS WITH HYPERGLYCEMIA, WITH LONG-TERM CURRENT USE OF INSULIN: Chronic | ICD-10-CM

## 2023-01-30 DIAGNOSIS — Z99.89 DEPENDENT ON WALKER FOR AMBULATION: ICD-10-CM

## 2023-01-30 DIAGNOSIS — E03.9 ACQUIRED HYPOTHYROIDISM: Chronic | ICD-10-CM

## 2023-01-30 DIAGNOSIS — I10 PRIMARY HYPERTENSION: Chronic | ICD-10-CM

## 2023-01-30 DIAGNOSIS — Z79.4 TYPE 2 DIABETES MELLITUS WITH HYPERGLYCEMIA, WITH LONG-TERM CURRENT USE OF INSULIN: Chronic | ICD-10-CM

## 2023-01-30 DIAGNOSIS — B35.4 TINEA CORPORIS: ICD-10-CM

## 2023-01-30 DIAGNOSIS — Z79.4 TYPE 2 DIABETES MELLITUS WITH STAGE 4 CHRONIC KIDNEY DISEASE, WITH LONG-TERM CURRENT USE OF INSULIN: Chronic | ICD-10-CM

## 2023-01-30 DIAGNOSIS — E78.2 MIXED HYPERLIPIDEMIA: Chronic | ICD-10-CM

## 2023-01-30 DIAGNOSIS — M15.9 PRIMARY OSTEOARTHRITIS INVOLVING MULTIPLE JOINTS: ICD-10-CM

## 2023-01-30 DIAGNOSIS — N18.4 CHRONIC KIDNEY DISEASE, STAGE 4 (SEVERE): Chronic | ICD-10-CM

## 2023-01-30 DIAGNOSIS — E11.22 TYPE 2 DIABETES MELLITUS WITH STAGE 4 CHRONIC KIDNEY DISEASE, WITH LONG-TERM CURRENT USE OF INSULIN: Chronic | ICD-10-CM

## 2023-01-30 DIAGNOSIS — Z00.00 ENCOUNTER FOR ANNUAL WELLNESS VISIT (AWV) IN MEDICARE PATIENT: Primary | ICD-10-CM

## 2023-01-30 DIAGNOSIS — I15.2 HYPERTENSION ASSOCIATED WITH TYPE 2 DIABETES MELLITUS: Chronic | ICD-10-CM

## 2023-01-30 PROCEDURE — G0008 FLU VACCINE - QUADRIVALENT - ADJUVANTED: ICD-10-PCS | Mod: ,,, | Performed by: FAMILY MEDICINE

## 2023-01-30 PROCEDURE — G0439 PPPS, SUBSEQ VISIT: HCPCS | Mod: ,,, | Performed by: FAMILY MEDICINE

## 2023-01-30 PROCEDURE — 90694 VACC AIIV4 NO PRSRV 0.5ML IM: CPT | Mod: ,,, | Performed by: FAMILY MEDICINE

## 2023-01-30 PROCEDURE — 90694 FLU VACCINE - QUADRIVALENT - ADJUVANTED: ICD-10-PCS | Mod: ,,, | Performed by: FAMILY MEDICINE

## 2023-01-30 PROCEDURE — G0439 PR MEDICARE ANNUAL WELLNESS SUBSEQUENT VISIT: ICD-10-PCS | Mod: ,,, | Performed by: FAMILY MEDICINE

## 2023-01-30 PROCEDURE — G0008 ADMIN INFLUENZA VIRUS VAC: HCPCS | Mod: ,,, | Performed by: FAMILY MEDICINE

## 2023-01-30 RX ORDER — GLIMEPIRIDE 4 MG/1
4 TABLET ORAL 2 TIMES DAILY
COMMUNITY

## 2023-01-30 RX ORDER — CEPHRADINE 500 MG
10000 CAPSULE ORAL
COMMUNITY

## 2023-01-30 RX ORDER — TRAMADOL HYDROCHLORIDE 50 MG/1
50 TABLET ORAL 2 TIMES DAILY WITH MEALS
COMMUNITY

## 2023-01-30 RX ORDER — FOLIC ACID 0.8 MG
400 TABLET ORAL
COMMUNITY

## 2023-01-30 RX ORDER — NYSTATIN 100000 [USP'U]/G
POWDER TOPICAL 4 TIMES DAILY
Qty: 60 G | Refills: 5 | Status: SHIPPED | OUTPATIENT
Start: 2023-01-30 | End: 2023-09-11

## 2023-01-30 RX ORDER — GLIPIZIDE 10 MG/1
TABLET ORAL
COMMUNITY
Start: 2022-03-07

## 2023-01-30 RX ORDER — HYDROCHLOROTHIAZIDE 12.5 MG/1
12.5 CAPSULE ORAL DAILY
COMMUNITY

## 2023-01-30 NOTE — PROGRESS NOTES
"  Patient ID: [id number]     Chief Complaint: Type 2 DM and Medication Refill  CC: AWV, with overseeing of chronic medical condition states    HPI:     Anne Alberts is a 82 y.o. female here today for a Medicare Wellness.     Age- and chronic condition-appropriate AWV completed today, with individual items addressed below as noted/needed.      Opioid Screening: Patient medication list reviewed, patient is not taking prescription opioids. Patient is not using additional opioids than prescribed. Patient is not at low risk of substance abuse based on this opioid use history.       ----------------------------  Diabetes mellitus, type 2  Disorder of kidney and ureter  Hypertension  Hypothyroidism     Past Surgical History:   Procedure Laterality Date    APPENDECTOMY      BREAST BIOPSY       SECTION         Review of patient's allergies indicates:   Allergen Reactions    Aspirin      Other reaction(s): Bleeding    Codeine Rash     Other reaction(s): Codeine       Outpatient Medications Marked as Taking for the 23 encounter (Office Visit) with Danyel Ngo MD   Medication Sig Dispense Refill    amLODIPine (NORVASC) 10 MG tablet Take 10 mg by mouth once daily.      BD BARBIE 2ND GEN PEN NEEDLE 32 gauge x 5/32" Ndle USE ONE NEEDLE SUBCUTANEOUSLY ONCE DAILY TO ADMINISTER TRESIBA 90      calcitRIOL (ROCALTROL) 0.25 MCG Cap Take 0.25 mcg by mouth once daily.      cholecalciferol, vitamin D3, (VITAMIN D3) 250 mcg (10,000 unit) Cap capsule Take 10,000 Units by mouth.      docusate sodium (COLACE) 100 MG capsule Take 1 capsule by mouth once daily.      folic acid (FOLVITE) 800 MCG Tab Take 400 mcg by mouth.      glimepiride (AMARYL) 4 MG tablet Take 4 mg by mouth 2 (two) times a day.      glipiZIDE (GLUCOTROL) 10 MG tablet   See Instructions, TAKE 1 TABLET BY MOUTH TWICE A DAY, # 60 tab(s), 3 Refill(s), Pharmacy: [id number], 146, cm, Height/Length Dosing, 21 13:08:00 CST, 97.97, kg, Weight Dosing, " "12/06/21 13:08:00 CST      hydroCHLOROthiazide (MICROZIDE) 12.5 mg capsule Take 12.5 mg by mouth once daily.      labetaloL (NORMODYNE) 300 MG tablet Take 300 mg by mouth 2 (two) times a day.      lisinopriL-hydrochlorothiazide (PRINZIDE,ZESTORETIC) 10-12.5 mg per tablet TAKE 1 TABLET BY MOUTH EVERY DAY 90 tablet 1    methIMAzole (TAPAZOLE) 10 MG Tab TAKE 1 TABLET BY MOUTH WITH FOOD EVERY OTHER DAY ALTERNATING WITH 1/2 TABLET      pravastatin (PRAVACHOL) 20 MG tablet Take 20 mg by mouth once daily.      traMADoL (ULTRAM) 50 mg tablet Take 50 mg by mouth 2 (two) times daily with meals.      TRULICITY 1.5 mg/0.5 mL pen injector Inject 1.5 mg into the skin once a week.         Social History     Socioeconomic History    Marital status:     Number of children: 3   Occupational History    Occupation: Retired   Tobacco Use    Smoking status: Former    Smokeless tobacco: Never   Substance and Sexual Activity    Alcohol use: Not Currently    Drug use: Not Currently     Social Determinants of Health     Transportation Needs: Unmet Transportation Needs    Lack of Transportation (Medical): Yes    Lack of Transportation (Non-Medical): Yes        Family History   Problem Relation Age of Onset    Heart disease Mother     Kidney disease Father     Stroke Father     Hypertension Father     Diabetes Father         Patient Care Team:  Danyel Ngo MD as PCP - General (Family Medicine)  Danyel Ngo MD       Subjective:     Review of Systems   All other systems reviewed and are negative.      Patient Reported Health Risk Assessment       Objective:     /79 (BP Location: Left arm, Patient Position: Sitting, BP Method: Large (Automatic))   Pulse 89   Temp 98 °F (36.7 °C) (Oral)   Resp 20   Ht 4' 9" (1.448 m)   Wt 97.1 kg (214 lb)   SpO2 99%   BMI 46.31 kg/m²     Physical Exam  Vitals and nursing note reviewed.   Constitutional:       General: She is awake.      Appearance: Normal appearance. She is " well-developed and well-groomed. She is morbidly obese.      Comments: Dependent on walker for stable ambulation   HENT:      Head: Normocephalic and atraumatic.      Right Ear: Tympanic membrane, ear canal and external ear normal.      Left Ear: Tympanic membrane, ear canal and external ear normal.      Nose: Nose normal.      Mouth/Throat:      Mouth: Mucous membranes are moist.      Pharynx: Oropharynx is clear.   Eyes:      Extraocular Movements: Extraocular movements intact.      Conjunctiva/sclera: Conjunctivae normal.      Pupils: Pupils are equal, round, and reactive to light.   Cardiovascular:      Rate and Rhythm: Normal rate and regular rhythm.      Pulses: Normal pulses.      Heart sounds: Normal heart sounds.   Pulmonary:      Effort: Pulmonary effort is normal.      Breath sounds: Normal breath sounds.   Abdominal:      General: Abdomen is flat. Bowel sounds are normal.      Palpations: Abdomen is soft.   Musculoskeletal:         General: Normal range of motion.      Cervical back: Normal range of motion and neck supple.   Skin:     General: Skin is warm and dry.      Capillary Refill: Capillary refill takes less than 2 seconds.   Neurological:      General: No focal deficit present.      Mental Status: She is alert and oriented to person, place, and time. Mental status is at baseline.   Psychiatric:         Mood and Affect: Mood normal.         Behavior: Behavior normal. Behavior is cooperative.         Thought Content: Thought content normal.         Judgment: Judgment normal.         No flowsheet data found.  Fall Risk Assessment - Outpatient 1/30/2023 6/16/2022   Mobility Status Ambulatory w/ assistance Ambulatory w/ assistance   Number of falls 0 0   Identified as fall risk 1 0              Assessment/Plan:     1. Encounter for annual wellness visit (AWV) in Medicare patient  Age- and chronic condition-appropriate AWV completed today, with individual items addressed below as noted/needed.    2.  Advanced directives, counseling/discussion   See Narrative    3. Morbid obesity   Weight loss efforts encouraged    4. Primary hypertension   Continue current prescription medications. Refills as needed   Condition/Symptoms controlled/stable   Surveillance labs ordered as needed, or reviewed in visit.   RTC 6 months (as scheduled) or PRN    5. Mixed hyperlipidemia   Continue current prescription medications. Refills as needed (taking statin)   Condition/Symptoms controlled/stable   Surveillance labs ordered as needed, or reviewed in visit.   RTC 6 months (as scheduled) or PRN    6. Chronic kidney disease, stage 4 (severe)   Continue current prescription medications. Refills as needed (also sees nephrology)   Condition/Symptoms controlled/stable (taking ACEi)   Surveillance labs ordered as needed, or reviewed in visit.   RTC 6 months (as scheduled) or PRN    7. Type 2 diabetes mellitus with hyperglycemia, with long-term current use of insulin  -     Ambulatory referral/consult to Ophthalmology; Future; Expected date: 02/06/2023   Continue current prescription medications. Refills as needed (taking insulin, trulicity)   Condition/Symptoms controlled/stable (last A1c 7.2)   Surveillance labs ordered as needed, or reviewed in visit.   RTC 6 months (as scheduled) or PRN   Also sees endocrine for this    8. Hypertension associated with type 2 diabetes mellitus  -     Ambulatory referral/consult to Ophthalmology; Future; Expected date: 02/06/2023   Continue current prescription medications. Refills as needed (taking ACEi)   Condition/Symptoms controlled/stable   Surveillance labs ordered as needed, or reviewed in visit.   RTC 6 months (as scheduled) or PRN    9. Mixed diabetic hyperlipidemia associated with type 2 diabetes mellitus  -     Ambulatory referral/consult to Ophthalmology; Future; Expected date: 02/06/2023   Continue current prescription medications. Refills as needed (taking statin)   Condition/Symptoms  controlled/stable   Surveillance labs ordered as needed, or reviewed in visit.   RTC 6 months (as scheduled) or PRN    10. Type 2 diabetes mellitus with stage 4 chronic kidney disease, with long-term current use of insulin   Continue current prescription medications. Refills as needed   Condition/Symptoms controlled/stable   Surveillance labs ordered as needed, or reviewed in visit.   RTC 6 months (as scheduled) or PRN   Also sees nephrology for this     11. Long term current use of insulin    12. Acquired hypothyroidism   Continue current prescription medications. Refills as needed   Condition/Symptoms controlled/stable   Surveillance labs ordered as needed, or reviewed in visit.   RTC 6 months (as scheduled) or PRN    13. Primary osteoarthritis involving multiple joints   Continue current prescription medications. Refills as needed   Condition/Symptoms controlled/stable   Surveillance labs ordered as needed, or reviewed in visit.   RTC 6 months (as scheduled) or PRN    14. Dependent on walker for ambulation   Baseline, stable, unchanged, discussed fall risks    15. BMI 45.0-49.9, adult  16. Body mass index (BMI) 45.0-49.9, adult   Weight loss efforts encouraged    17. Flu vaccine need  -     Influenza (FLUAD) - Quadrivalent (Adjuvanted) *Preferred* (65+) (PF)    18. Tinea corporis  -     nystatin (MYCOSTATIN) powder; Apply topically 4 (four) times daily.  Dispense: 60 g; Refill: 5   Continue current prescription medications. Refills as needed   Condition/Symptoms controlled/stable   Surveillance labs ordered as needed, or reviewed in visit.   RTC 6 months (as scheduled) or PRN             Medicare Annual Wellness and Personalized Prevention Plan:   Fall Risk + Home Safety + Hearing Impairment + Depression Screen + Opioid and Substance Abuse Screening + Cognitive Impairment Screen + Health Risk Assessment all reviewed.     Health Maintenance Topics with due status: Not Due       Topic Last Completion Date    DEXA  Scan 04/21/2021    Lipid Panel 10/31/2022    Hemoglobin A1c 10/31/2022      The patient's Health Maintenance was reviewed and the following appears to be due at this time:   Health Maintenance Due   Topic Date Due    TETANUS VACCINE  Never done    Eye Exam  08/23/2019    Shingles Vaccine (2 of 2) 09/15/2019    COVID-19 Vaccine (4 - Booster for Pfizer series) 12/10/2021    Diabetes Urine Screening  04/12/2022    Influenza Vaccine (1) 09/01/2022       Advance Care Planning   I attest to discussing Advance Care Planning with patient and/or family member.  Education was provided including the importance of the Health Care Power of , Advance Directives, and/or [place] documentation.  The patient expressed understanding to the importance of this information and discussion.       Advance Directives:   Living Will: No        Oral Declaration: No    [place]POST: No    Do Not Resuscitate Status: No    Medical Power of : No        Oral Declaration: No      Decision Making:  Patient answered questions  Goals of Care: Patient will take home our blank forms to consider and return completed at her convenience.          Follow up in about 6 months (around 7/30/2023). In addition to their scheduled follow up, the patient has also been instructed to follow up on as needed basis.

## 2023-02-23 ENCOUNTER — TELEPHONE (OUTPATIENT)
Dept: FAMILY MEDICINE | Facility: CLINIC | Age: 83
End: 2023-02-23
Payer: MEDICARE

## 2023-02-23 NOTE — TELEPHONE ENCOUNTER
----- Message from Rose Crow sent at 2/23/2023  1:57 PM CST -----  Regarding: med advice  .Type:  Needs Medical Advice    Who Called: Pt  Symptoms (please be specific): scratchy throat  How long has patient had these symptoms:  scratchy throat  Pharmacy name and phone #:  [id number] [place] [address]  Would the patient rather a call back or a response via [facility]? Call back  Best Call Back Number: [phone]  Additional Information: Pt is covid positive, wanting to know what she can do or take being she's a bad diabetic.

## 2023-02-23 NOTE — TELEPHONE ENCOUNTER
Returned call to pt. Per Dr. Ngo, pt advised to seek medical attention @ an urgent care for tx. Pt. declined to go to urgent care, stating that she does not have any symptoms. Was informed by pt. that daughter returned home from a trip to [place] and tested positive for Covid. Pt. feels her daughter exposed her and her son. Instructed pt to take proper protocol of wearing a mask out in public after 5 day quarantine.

## 2023-03-23 DIAGNOSIS — I10 PRIMARY HYPERTENSION: Chronic | ICD-10-CM

## 2023-03-23 RX ORDER — DULAGLUTIDE 1.5 MG/.5ML
1.5 INJECTION, SOLUTION SUBCUTANEOUS WEEKLY
Qty: 12 PEN | Refills: 0 | Status: SHIPPED | OUTPATIENT
Start: 2023-03-23 | End: 2023-06-21

## 2023-03-23 RX ORDER — AMLODIPINE BESYLATE 10 MG/1
10 TABLET ORAL DAILY
Qty: 90 TABLET | Refills: 0 | Status: SHIPPED | OUTPATIENT
Start: 2023-03-23 | End: 2023-09-11

## 2023-03-23 RX ORDER — LISINOPRIL AND HYDROCHLOROTHIAZIDE 10; 12.5 MG/1; MG/1
1 TABLET ORAL DAILY
Qty: 90 TABLET | Refills: 1 | Status: SHIPPED | OUTPATIENT
Start: 2023-03-23 | End: 2024-01-30 | Stop reason: SDUPTHER

## 2023-03-23 NOTE — TELEPHONE ENCOUNTER
----- Message from Rose Crow sent at 3/23/2023 11:26 AM CDT -----  Regarding: med refill  .Type:  RX Refill Request    Who Called: pt  Refill or New Rx:refill  RX Name and Strength:lisinopriL-hydrochlorothiazide (PRINZIDE,ZESTORETIC) 10-12.5 mg per tablet  How is the patient currently taking it? (ex. 1XDay):1xday  Is this a 30 day or 90 day RX:90  Preferred Pharmacy with phone number:[id number]  Local or Mail Order:local  Ordering Provider:Huber  Would the patient rather a call back or a response via [facility]? Call back  Best Call Back Number:[phone]  Additional Information:     .Type:  RX Refill Request    Who Called: pt  Refill or New Rx:refill  RX Name and Strength:amLODIPine (NORVASC) 10 MG tablet  How is the patient currently taking it? (ex. 1XDay):1xday  Is this a 30 day or 90 day RX:  Preferred Pharmacy with phone number:[id number] - [facility] - [address]  Local or Mail Order:local  Ordering Provider:Huber  Would the patient rather a call back or a response via [facility]? Call back  Best Call Back Number:[phone]  Additional Information:     .Type:  RX Refill Request    Who Called: pt  Refill or New Rx:refill  RX Name and Strength:TRULICITY 1.5 mg/0.5 mL pen injector  How is the patient currently taking it? (ex. 1XDay):  Is this a 30 day or 90 day RX:  Preferred Pharmacy with phone number:[id number] - [facility] - [place]  Local or Mail Order:local  Ordering Provider:Ancelet  Would the patient rather a call back or a response via [facility]? Call back  Best Call Back Number:[phone]  Additional Information:

## 2023-07-10 ENCOUNTER — LAB VISIT (OUTPATIENT)
Dept: LAB | Facility: HOSPITAL | Age: 83
End: 2023-07-10
Attending: STUDENT IN AN ORGANIZED HEALTH CARE EDUCATION/TRAINING PROGRAM
Payer: MEDICARE

## 2023-07-10 DIAGNOSIS — N18.4 CHRONIC KIDNEY DISEASE, STAGE IV (SEVERE): Primary | ICD-10-CM

## 2023-07-10 DIAGNOSIS — N18.6 TYPE 2 DIABETES MELLITUS WITH END-STAGE RENAL DISEASE: ICD-10-CM

## 2023-07-10 DIAGNOSIS — I12.9 HYPERTENSIVE NEPHROPATHY: ICD-10-CM

## 2023-07-10 DIAGNOSIS — E11.22 TYPE 2 DIABETES MELLITUS WITH END-STAGE RENAL DISEASE: ICD-10-CM

## 2023-07-10 LAB
ALBUMIN SERPL-MCNC: 3.7 G/DL (ref 3.4–4.8)
BUN SERPL-MCNC: 60.8 MG/DL (ref 9.8–20.1)
CALCIUM SERPL-MCNC: 10.4 MG/DL (ref 8.4–10.2)
CHLORIDE SERPL-SCNC: 106 MMOL/L (ref 98–107)
CO2 SERPL-SCNC: 23 MMOL/L (ref 23–31)
CREAT SERPL-MCNC: 2.67 MG/DL (ref 0.55–1.02)
ERYTHROCYTE [DISTWIDTH] IN BLOOD BY AUTOMATED COUNT: 14.9 % (ref 11.5–17)
GFR SERPLBLD CREATININE-BSD FMLA CKD-EPI: 17 MLS/MIN/1.73/M2
GLUCOSE SERPL-MCNC: 226 MG/DL (ref 82–115)
HCT VFR BLD AUTO: 33 % (ref 37–47)
HGB BLD-MCNC: 10.9 G/DL (ref 12–16)
MCH RBC QN AUTO: 29.8 PG (ref 27–31)
MCHC RBC AUTO-ENTMCNC: 33 G/DL (ref 33–36)
MCV RBC AUTO: 90.2 FL (ref 80–94)
NRBC BLD AUTO-RTO: 0 %
PHOSPHATE SERPL-MCNC: 3.1 MG/DL (ref 2.3–4.7)
PLATELET # BLD AUTO: 241 X10(3)/MCL (ref 130–400)
PMV BLD AUTO: 10.5 FL (ref 7.4–10.4)
POTASSIUM SERPL-SCNC: 4.5 MMOL/L (ref 3.5–5.1)
RBC # BLD AUTO: 3.66 X10(6)/MCL (ref 4.2–5.4)
SODIUM SERPL-SCNC: 141 MMOL/L (ref 136–145)
WBC # SPEC AUTO: 7.37 X10(3)/MCL (ref 4.5–11.5)

## 2023-07-10 PROCEDURE — 80069 RENAL FUNCTION PANEL: CPT

## 2023-07-10 PROCEDURE — 36415 COLL VENOUS BLD VENIPUNCTURE: CPT

## 2023-07-10 PROCEDURE — 85027 COMPLETE CBC AUTOMATED: CPT

## 2023-08-16 ENCOUNTER — LAB VISIT (OUTPATIENT)
Dept: LAB | Facility: HOSPITAL | Age: 83
End: 2023-08-16
Attending: STUDENT IN AN ORGANIZED HEALTH CARE EDUCATION/TRAINING PROGRAM
Payer: MEDICARE

## 2023-08-16 DIAGNOSIS — I12.9 HYPERTENSIVE NEPHROPATHY: ICD-10-CM

## 2023-08-16 DIAGNOSIS — N18.4 CHRONIC KIDNEY DISEASE, STAGE IV (SEVERE): Primary | ICD-10-CM

## 2023-08-16 DIAGNOSIS — N18.6 TYPE 2 DIABETES MELLITUS WITH END-STAGE RENAL DISEASE: ICD-10-CM

## 2023-08-16 DIAGNOSIS — E11.22 TYPE 2 DIABETES MELLITUS WITH END-STAGE RENAL DISEASE: ICD-10-CM

## 2023-08-16 LAB
ALBUMIN SERPL-MCNC: 3.8 G/DL (ref 3.4–4.8)
BUN SERPL-MCNC: 56.3 MG/DL (ref 9.8–20.1)
CALCIUM SERPL-MCNC: 10.6 MG/DL (ref 8.4–10.2)
CHLORIDE SERPL-SCNC: 110 MMOL/L (ref 98–107)
CO2 SERPL-SCNC: 24 MMOL/L (ref 23–31)
CREAT SERPL-MCNC: 2.53 MG/DL (ref 0.55–1.02)
ERYTHROCYTE [DISTWIDTH] IN BLOOD BY AUTOMATED COUNT: 14.9 % (ref 11.5–17)
GFR SERPLBLD CREATININE-BSD FMLA CKD-EPI: 19 MLS/MIN/1.73/M2
GLUCOSE SERPL-MCNC: 105 MG/DL (ref 82–115)
HCT VFR BLD AUTO: 33.5 % (ref 37–47)
HGB BLD-MCNC: 11.1 G/DL (ref 12–16)
MCH RBC QN AUTO: 29.5 PG (ref 27–31)
MCHC RBC AUTO-ENTMCNC: 33.1 G/DL (ref 33–36)
MCV RBC AUTO: 89.1 FL (ref 80–94)
NRBC BLD AUTO-RTO: 0 %
PHOSPHATE SERPL-MCNC: 4.4 MG/DL (ref 2.3–4.7)
PLATELET # BLD AUTO: 240 X10(3)/MCL (ref 130–400)
PMV BLD AUTO: 10.8 FL (ref 7.4–10.4)
POTASSIUM SERPL-SCNC: 4.7 MMOL/L (ref 3.5–5.1)
RBC # BLD AUTO: 3.76 X10(6)/MCL (ref 4.2–5.4)
SODIUM SERPL-SCNC: 146 MMOL/L (ref 136–145)
WBC # SPEC AUTO: 6.71 X10(3)/MCL (ref 4.5–11.5)

## 2023-08-16 PROCEDURE — 36415 COLL VENOUS BLD VENIPUNCTURE: CPT

## 2023-08-16 PROCEDURE — 85027 COMPLETE CBC AUTOMATED: CPT

## 2023-08-16 PROCEDURE — 80069 RENAL FUNCTION PANEL: CPT

## 2023-09-11 ENCOUNTER — OFFICE VISIT (OUTPATIENT)
Dept: FAMILY MEDICINE | Facility: CLINIC | Age: 83
End: 2023-09-11
Payer: MEDICARE

## 2023-09-11 VITALS
TEMPERATURE: 98 F | OXYGEN SATURATION: 100 % | WEIGHT: 223.5 LBS | HEIGHT: 57 IN | SYSTOLIC BLOOD PRESSURE: 128 MMHG | DIASTOLIC BLOOD PRESSURE: 79 MMHG | HEART RATE: 137 BPM | RESPIRATION RATE: 18 BRPM | BODY MASS INDEX: 48.22 KG/M2

## 2023-09-11 DIAGNOSIS — E05.90 HYPERTHYROIDISM: ICD-10-CM

## 2023-09-11 DIAGNOSIS — I10 PRIMARY HYPERTENSION: Chronic | ICD-10-CM

## 2023-09-11 DIAGNOSIS — E11.9 NON-INSULIN DEPENDENT TYPE 2 DIABETES MELLITUS: Primary | ICD-10-CM

## 2023-09-11 DIAGNOSIS — N18.4 CHRONIC KIDNEY DISEASE, STAGE 4 (SEVERE): Chronic | ICD-10-CM

## 2023-09-11 DIAGNOSIS — M15.9 PRIMARY OSTEOARTHRITIS INVOLVING MULTIPLE JOINTS: ICD-10-CM

## 2023-09-11 DIAGNOSIS — L30.4 INTERTRIGO: ICD-10-CM

## 2023-09-11 DIAGNOSIS — E78.2 MIXED HYPERLIPIDEMIA: Chronic | ICD-10-CM

## 2023-09-11 PROBLEM — E11.22 TYPE 2 DIABETES MELLITUS WITH STAGE 4 CHRONIC KIDNEY DISEASE, WITH LONG-TERM CURRENT USE OF INSULIN: Chronic | Status: RESOLVED | Noted: 2022-06-16 | Resolved: 2023-09-11

## 2023-09-11 PROBLEM — E11.65 TYPE 2 DIABETES MELLITUS WITH HYPERGLYCEMIA, WITH LONG-TERM CURRENT USE OF INSULIN: Chronic | Status: RESOLVED | Noted: 2022-06-16 | Resolved: 2023-09-11

## 2023-09-11 PROBLEM — E66.01 MORBID OBESITY: Status: RESOLVED | Noted: 2022-06-16 | Resolved: 2023-09-11

## 2023-09-11 PROBLEM — Z99.89 DEPENDENT ON WALKER FOR AMBULATION: Status: RESOLVED | Noted: 2022-06-16 | Resolved: 2023-09-11

## 2023-09-11 PROBLEM — Z79.4 TYPE 2 DIABETES MELLITUS WITH STAGE 4 CHRONIC KIDNEY DISEASE, WITH LONG-TERM CURRENT USE OF INSULIN: Chronic | Status: RESOLVED | Noted: 2022-06-16 | Resolved: 2023-09-11

## 2023-09-11 PROBLEM — Z23 NEED FOR IMMUNIZATION AGAINST INFLUENZA: Status: RESOLVED | Noted: 2022-06-16 | Resolved: 2023-09-11

## 2023-09-11 PROBLEM — E11.59 HYPERTENSION ASSOCIATED WITH TYPE 2 DIABETES MELLITUS: Chronic | Status: RESOLVED | Noted: 2023-01-30 | Resolved: 2023-09-11

## 2023-09-11 PROBLEM — Z79.4 TYPE 2 DIABETES MELLITUS WITH HYPERGLYCEMIA, WITH LONG-TERM CURRENT USE OF INSULIN: Chronic | Status: RESOLVED | Noted: 2022-06-16 | Resolved: 2023-09-11

## 2023-09-11 PROBLEM — I15.2 HYPERTENSION ASSOCIATED WITH TYPE 2 DIABETES MELLITUS: Chronic | Status: RESOLVED | Noted: 2023-01-30 | Resolved: 2023-09-11

## 2023-09-11 PROBLEM — Z79.4 LONG TERM CURRENT USE OF INSULIN: Chronic | Status: RESOLVED | Noted: 2022-06-16 | Resolved: 2023-09-11

## 2023-09-11 PROCEDURE — 99214 OFFICE O/P EST MOD 30 MIN: CPT | Mod: ,,, | Performed by: STUDENT IN AN ORGANIZED HEALTH CARE EDUCATION/TRAINING PROGRAM

## 2023-09-11 PROCEDURE — 99214 PR OFFICE/OUTPT VISIT, EST, LEVL IV, 30-39 MIN: ICD-10-PCS | Mod: ,,, | Performed by: STUDENT IN AN ORGANIZED HEALTH CARE EDUCATION/TRAINING PROGRAM

## 2023-09-11 RX ORDER — AMOXICILLIN 500 MG
CAPSULE ORAL DAILY
COMMUNITY

## 2023-09-11 NOTE — ASSESSMENT & PLAN NOTE
- Patient following with Dr. Raymond Castañeda with Endocrinology.  - Methimazole per Endocrinology.

## 2023-09-11 NOTE — ASSESSMENT & PLAN NOTE
- Patient following with Dr. Jamaal Lloyd with Nephrology.  - Nephrology office visit from 08/21/23 reviewed.

## 2023-09-11 NOTE — ASSESSMENT & PLAN NOTE
- Patient taking Tramadol PRN. She was informed that I would not continue Tramadol for her. She verbalized understanding.  - Patient not amenable to Pain Management referral.

## 2023-09-11 NOTE — ASSESSMENT & PLAN NOTE
- Patient following with Dr. Raymond Castañeda with Endocrinology.   - Medication regimen and management per Endocrinology.

## 2023-09-11 NOTE — PROGRESS NOTES
"Subjective:      Patient ID: Anne Alberts is a 83 y.o.  female. Patient accompanied by her son, Mr. Soren Alberts.    Chief Complaint: Establish Care    NIDDMII: Patient following with Dr. Raymond Castañeda with Endocrinology. Patient taking Amaryl and Glipizide.    Hyperthyroidism:  Patient following with Dr. Raymond Castañeda with Endocrinology. Patient taking Methimazole.    HTN/HLD: /79. Patient taking Norvasc, HCTZ, Labetalol, Zestoretic, and Pravastatin.    CKDIV: Patient following with Dr. Jamaal Lloyd with Nephrology.    Chronic Pain/Osteoarthritis: Patient is taking Tramadol. She is not amenable to Pain Management referral at this time.    Rash: Onset x 1 month ago. She's had this before. Located underneath right abdominal fold. Associated symptoms include redness. She describes it as a burning. She's been using Nystatin powder and placing a dry pad underneath it.    Review of Systems   Constitutional:  Negative for activity change, fatigue and fever.   Eyes:  Negative for visual disturbance.   Respiratory:  Negative for apnea, cough and shortness of breath.    Cardiovascular:  Negative for chest pain and leg swelling.   Gastrointestinal:  Negative for abdominal pain, diarrhea and nausea.   Genitourinary:  Negative for dysuria and hematuria.   Musculoskeletal:  Positive for arthralgias.   Skin:  Positive for rash. Negative for wound.   Neurological:  Negative for dizziness, weakness, numbness and headaches.   Psychiatric/Behavioral:  Negative for behavioral problems, dysphoric mood and sleep disturbance. The patient is not nervous/anxious.      Objective:   /79 (BP Location: Right arm, Patient Position: Sitting, BP Method: Small (Automatic))   Pulse (!) 137   Temp 97.9 °F (36.6 °C) (Temporal)   Resp 18   Ht 4' 9" (1.448 m)   Wt 101.4 kg (223 lb 8 oz)   SpO2 100%   BMI 48.36 kg/m²     Physical Exam  Vitals and nursing note reviewed.   Constitutional:       General: She is not in acute " distress.     Appearance: Normal appearance. She is obese. She is not ill-appearing or toxic-appearing.   HENT:      Head: Normocephalic and atraumatic.   Eyes:      Conjunctiva/sclera: Conjunctivae normal.   Cardiovascular:      Rate and Rhythm: Regular rhythm. Tachycardia present.      Heart sounds: Normal heart sounds. No murmur heard.  Pulmonary:      Effort: Pulmonary effort is normal. No respiratory distress.      Breath sounds: Normal breath sounds.   Abdominal:      General: There is no distension.      Palpations: Abdomen is soft.      Tenderness: There is no abdominal tenderness.   Musculoskeletal:         General: No deformity.      Right lower leg: No edema.      Left lower leg: No edema.   Skin:     General: Skin is warm and dry.      Findings: Rash (underneath right abdominal fold) present. No lesion.   Neurological:      General: No focal deficit present.      Mental Status: She is alert.      Motor: No weakness.      Coordination: Coordination normal.   Psychiatric:         Mood and Affect: Mood normal.         Behavior: Behavior normal.         Thought Content: Thought content normal.         Judgment: Judgment normal.       Assessment/Plan:   1. Non-insulin dependent type 2 diabetes mellitus  Assessment & Plan:  - Patient following with Dr. Raymond Castañeda with Endocrinology.   - Medication regimen and management per Endocrinology.      2. Hyperthyroidism  Assessment & Plan:  - Patient following with Dr. Raymond Castañeda with Endocrinology.  - Methimazole per Endocrinology.      3. Primary hypertension  Assessment & Plan:  - BP well-controlled.  - Continue Norvasc 10mg daily, HCTZ 12.5mg daily, Labetalol 300mg BID, and Zestoretic 10mg-12.5mg daily.      4. Mixed hyperlipidemia  Assessment & Plan:  - Continue Pravastatin 20mg daily.      5. Chronic kidney disease, stage 4 (severe)  Assessment & Plan:  - Patient following with Dr. Jamaal Lloyd with Nephrology.  - Nephrology office visit from 08/21/23  reviewed.      6. Primary osteoarthritis involving multiple joints  Assessment & Plan:  - Patient taking Tramadol PRN. She was informed that I would not continue Tramadol for her. She verbalized understanding.  - Patient not amenable to Pain Management referral.      7. Intertrigo  Comments:  - Continue Nystatin powder.       Follow up in about 6 months (around 3/11/2024) for Medicare Wellness.

## 2023-09-11 NOTE — ASSESSMENT & PLAN NOTE
- BP well-controlled.  - Continue Norvasc 10mg daily, HCTZ 12.5mg daily, Labetalol 300mg BID, and Zestoretic 10mg-12.5mg daily.

## 2024-01-23 ENCOUNTER — OFFICE VISIT (OUTPATIENT)
Dept: FAMILY MEDICINE | Facility: CLINIC | Age: 84
End: 2024-01-23
Payer: MEDICARE

## 2024-01-23 VITALS
RESPIRATION RATE: 16 BRPM | SYSTOLIC BLOOD PRESSURE: 118 MMHG | HEART RATE: 63 BPM | OXYGEN SATURATION: 97 % | HEIGHT: 57 IN | WEIGHT: 219 LBS | BODY MASS INDEX: 47.25 KG/M2 | TEMPERATURE: 98 F | DIASTOLIC BLOOD PRESSURE: 76 MMHG

## 2024-01-23 DIAGNOSIS — R54 AGE-RELATED PHYSICAL DEBILITY: Primary | ICD-10-CM

## 2024-01-23 DIAGNOSIS — N18.4 CHRONIC KIDNEY DISEASE, STAGE 4 (SEVERE): Chronic | ICD-10-CM

## 2024-01-23 DIAGNOSIS — Z91.81 AT HIGH RISK FOR FALLS: ICD-10-CM

## 2024-01-23 PROCEDURE — 99214 OFFICE O/P EST MOD 30 MIN: CPT | Mod: ,,, | Performed by: STUDENT IN AN ORGANIZED HEALTH CARE EDUCATION/TRAINING PROGRAM

## 2024-01-23 RX ORDER — DULAGLUTIDE 1.5 MG/.5ML
INJECTION, SOLUTION SUBCUTANEOUS
COMMUNITY
Start: 2024-01-08

## 2024-01-23 RX ORDER — INSULIN DEGLUDEC 100 U/ML
INJECTION, SOLUTION SUBCUTANEOUS
COMMUNITY
Start: 2023-12-24

## 2024-01-23 RX ORDER — REPAGLINIDE 2 MG/1
2 TABLET ORAL 2 TIMES DAILY
COMMUNITY
Start: 2024-01-10

## 2024-01-23 RX ORDER — BLOOD SUGAR DIAGNOSTIC
STRIP MISCELLANEOUS
COMMUNITY
Start: 2024-01-12

## 2024-01-23 NOTE — PROGRESS NOTES
"Subjective:      Patient ID: Anne Alberts is a 83 y.o.  female. Patient accompanied by her son, Mr. Soren Alberts, and daughter-in-law.    Chief Complaint: Home Health Referral    Home Health Referral: Patient evaluated by [facility] on 01/04/24 for weakness. ER evaluation overall negative. Patient states she has a hard time keeping up with her medications and also needs assistance with her ADLs such as bathing.    Review of Systems   Constitutional:  Negative for activity change, fatigue and fever.   Eyes:  Negative for visual disturbance.   Respiratory:  Negative for apnea, cough and shortness of breath.    Cardiovascular:  Positive for leg swelling. Negative for chest pain.   Gastrointestinal:  Negative for abdominal pain, diarrhea and nausea.   Genitourinary:  Negative for dysuria and hematuria.   Musculoskeletal:  Positive for arthralgias.   Skin:  Negative for rash and wound.   Neurological:  Positive for weakness. Negative for dizziness, numbness and headaches.   Psychiatric/Behavioral:  Negative for behavioral problems, dysphoric mood and sleep disturbance. The patient is not nervous/anxious.      Objective:   /76 (BP Location: Right arm)   Pulse 63   Temp 97.9 °F (36.6 °C) (Temporal)   Resp 16   Ht 4' 9" (1.448 m)   Wt 99.3 kg (219 lb)   SpO2 97%   BMI 47.39 kg/m²     Physical Exam  Vitals and nursing note reviewed.   Constitutional:       General: She is not in acute distress.     Appearance: Normal appearance. She is obese. She is ill-appearing (chronically). She is not toxic-appearing or diaphoretic.   HENT:      Head: Normocephalic and atraumatic.   Eyes:      Conjunctiva/sclera: Conjunctivae normal.   Cardiovascular:      Rate and Rhythm: Normal rate and regular rhythm.      Heart sounds: Normal heart sounds. No murmur heard.  Pulmonary:      Effort: Pulmonary effort is normal. No respiratory distress.      Breath sounds: Normal breath sounds.   Abdominal:      General: There " is no distension.      Palpations: Abdomen is soft.      Tenderness: There is no abdominal tenderness.   Musculoskeletal:         General: No deformity.      Right lower leg: Edema present.      Left lower leg: Edema present.      Comments: Patient sitting comfortably in wheelchair.   Skin:     General: Skin is warm and dry.      Findings: No lesion or rash.   Neurological:      General: No focal deficit present.      Mental Status: She is alert.      Motor: Weakness (generalized) present.      Coordination: Coordination normal.   Psychiatric:         Mood and Affect: Mood normal.         Behavior: Behavior normal.         Thought Content: Thought content normal.         Judgment: Judgment normal.       Assessment/Plan:   1. Age-related physical debility  Comments:  - ER record reviewed.  Orders:  -     Ambulatory referral/consult to Home Health; Future; Expected date: 01/24/2024

## 2024-01-25 PROCEDURE — G0180 MD CERTIFICATION HHA PATIENT: HCPCS | Mod: ,,, | Performed by: STUDENT IN AN ORGANIZED HEALTH CARE EDUCATION/TRAINING PROGRAM

## 2024-01-30 DIAGNOSIS — I10 PRIMARY HYPERTENSION: Primary | Chronic | ICD-10-CM

## 2024-01-30 RX ORDER — LISINOPRIL AND HYDROCHLOROTHIAZIDE 10; 12.5 MG/1; MG/1
1 TABLET ORAL DAILY
Qty: 90 TABLET | Refills: 3 | Status: SHIPPED | OUTPATIENT
Start: 2024-01-30

## 2024-01-30 NOTE — TELEPHONE ENCOUNTER
----- Message from Monae Martinez sent at 1/30/2024  2:23 PM CST -----  Regarding: refill request  Type:  RX Refill Request    Who Called:  pt daughter (jazmin)    Refill or New Rx: refill    RX Name and Strength: lisinopriL-hydrochlorothiazide (PRINZIDE,ZESTORETIC) 10-12.5 mg per tablet    How is the patient currently taking it? (ex. 1XDay): one day    Is this a 30 day or 90 day RX: 90    [facility] [address]    Local or Mail Order: local    Ordering Provider: corrina    Would the patient rather a call back or a response via [facility]?  Yes    Best Call Back Number: [phone]    Additional Information:  pt daughter called in regards to request for a Rx for her mother(pt), daughter stated advised by pharmacy to call for refill request, please  advise, thanks

## 2024-02-08 ENCOUNTER — EXTERNAL HOME HEALTH (OUTPATIENT)
Dept: HOME HEALTH SERVICES | Facility: HOSPITAL | Age: 84
End: 2024-02-08
Payer: MEDICARE

## 2024-02-19 ENCOUNTER — TELEPHONE (OUTPATIENT)
Dept: FAMILY MEDICINE | Facility: CLINIC | Age: 84
End: 2024-02-19
Payer: MEDICARE

## 2024-02-19 DIAGNOSIS — R05.9 COUGH, UNSPECIFIED TYPE: Primary | ICD-10-CM

## 2024-02-19 NOTE — TELEPHONE ENCOUNTER
----- Message from Keiko Castillo sent at 2/19/2024  3:59 PM CST -----  .Type:  Needs Medical Advice    Who Called: [facility]  Symptoms (please be specific):    How long has patient had these symptoms:    Pharmacy name and phone #:    Would the patient rather a call back or a response via [facility]?   Best Call Back Number: [phone] fax [phone]  Additional Information: pt not feeling well she check herself for covid she negative but need order to check for flu

## 2024-02-20 ENCOUNTER — LAB REQUISITION (OUTPATIENT)
Dept: LAB | Facility: HOSPITAL | Age: 84
End: 2024-02-20
Payer: MEDICARE

## 2024-02-20 DIAGNOSIS — R05.9 COUGH, UNSPECIFIED: ICD-10-CM

## 2024-02-20 LAB
FLUAV AG UPPER RESP QL IA.RAPID: NOT DETECTED
FLUBV AG UPPER RESP QL IA.RAPID: NOT DETECTED

## 2024-02-20 PROCEDURE — 87502 INFLUENZA DNA AMP PROBE: CPT | Performed by: STUDENT IN AN ORGANIZED HEALTH CARE EDUCATION/TRAINING PROGRAM

## 2024-03-14 ENCOUNTER — DOCUMENT SCAN (OUTPATIENT)
Dept: HOME HEALTH SERVICES | Facility: HOSPITAL | Age: 84
End: 2024-03-14
Payer: MEDICARE

## 2024-03-21 ENCOUNTER — TELEPHONE (OUTPATIENT)
Dept: FAMILY MEDICINE | Facility: CLINIC | Age: 84
End: 2024-03-21
Payer: MEDICARE

## 2024-03-21 NOTE — TELEPHONE ENCOUNTER
----- Message from Jessica Vera sent at 3/21/2024  9:05 AM CDT -----  Regarding: home health re cert  .Type:  Needs Medical Advice    Who Called: [facility]  Would the patient rather a call back or a response via [facility]?   Best Call Back Number: [phone] - (sherry SIMMS) office num- [phone] fax - [phone]  Additional Information: Re cert for home health approval needed

## 2024-03-25 PROCEDURE — G0179 MD RECERTIFICATION HHA PT: HCPCS | Mod: ,,, | Performed by: STUDENT IN AN ORGANIZED HEALTH CARE EDUCATION/TRAINING PROGRAM

## 2024-04-09 ENCOUNTER — LAB REQUISITION (OUTPATIENT)
Dept: LAB | Facility: HOSPITAL | Age: 84
End: 2024-04-09
Payer: MEDICARE

## 2024-04-09 DIAGNOSIS — E55.9 VITAMIN D DEFICIENCY, UNSPECIFIED: ICD-10-CM

## 2024-04-09 DIAGNOSIS — I10 ESSENTIAL (PRIMARY) HYPERTENSION: ICD-10-CM

## 2024-04-09 DIAGNOSIS — E78.00 PURE HYPERCHOLESTEROLEMIA, UNSPECIFIED: ICD-10-CM

## 2024-04-09 DIAGNOSIS — E05.00 THYROTOXICOSIS WITH DIFFUSE GOITER WITHOUT THYROTOXIC CRISIS OR STORM: ICD-10-CM

## 2024-04-09 LAB
ALBUMIN SERPL-MCNC: 3.7 G/DL (ref 3.4–4.8)
ALBUMIN/GLOB SERPL: 1.4 RATIO (ref 1.1–2)
ALP SERPL-CCNC: 60 UNIT/L (ref 40–150)
ALT SERPL-CCNC: 14 UNIT/L (ref 0–55)
AST SERPL-CCNC: 15 UNIT/L (ref 5–34)
BILIRUB SERPL-MCNC: 0.4 MG/DL
BILIRUBIN DIRECT+TOT PNL SERPL-MCNC: 0.1 MG/DL (ref 0–?)
BUN SERPL-MCNC: 62.2 MG/DL (ref 9.8–20.1)
CALCIUM SERPL-MCNC: 9.9 MG/DL (ref 8.4–10.2)
CHLORIDE SERPL-SCNC: 108 MMOL/L (ref 98–107)
CHOLEST SERPL-MCNC: 172 MG/DL
CHOLEST/HDLC SERPL: 4 {RATIO} (ref 0–5)
CK SERPL-CCNC: 141 U/L (ref 29–168)
CO2 SERPL-SCNC: 21 MMOL/L (ref 23–31)
CREAT SERPL-MCNC: 1.91 MG/DL (ref 0.55–1.02)
DEPRECATED CALCIDIOL+CALCIFEROL SERPL-MC: 65.4 NG/ML (ref 30–80)
GFR SERPLBLD CREATININE-BSD FMLA CKD-EPI: 26 MLS/MIN/1.73/M2
GLOBULIN SER-MCNC: 2.6 GM/DL (ref 2.4–3.5)
GLUCOSE SERPL-MCNC: 55 MG/DL (ref 82–115)
HDLC SERPL-MCNC: 45 MG/DL (ref 35–60)
LDLC SERPL CALC-MCNC: 107 MG/DL (ref 50–140)
PHOSPHATE SERPL-MCNC: 3.8 MG/DL (ref 2.3–4.7)
POTASSIUM SERPL-SCNC: 5.1 MMOL/L (ref 3.5–5.1)
PROT SERPL-MCNC: 6.3 GM/DL (ref 5.8–7.6)
SODIUM SERPL-SCNC: 141 MMOL/L (ref 136–145)
T3FREE SERPL-MCNC: 2.57 PG/ML (ref 1.58–3.91)
T4 FREE SERPL-MCNC: 0.78 NG/DL (ref 0.7–1.48)
TRIGL SERPL-MCNC: 100 MG/DL (ref 37–140)
TSH SERPL-ACNC: 1.99 UIU/ML (ref 0.35–4.94)
URATE SERPL-MCNC: 9.4 MG/DL (ref 2.6–6)
VLDLC SERPL CALC-MCNC: 20 MG/DL

## 2024-04-09 PROCEDURE — 84481 FREE ASSAY (FT-3): CPT

## 2024-04-09 PROCEDURE — 82248 BILIRUBIN DIRECT: CPT

## 2024-04-09 PROCEDURE — 84100 ASSAY OF PHOSPHORUS: CPT

## 2024-04-09 PROCEDURE — 84439 ASSAY OF FREE THYROXINE: CPT

## 2024-04-09 PROCEDURE — 82306 VITAMIN D 25 HYDROXY: CPT

## 2024-04-09 PROCEDURE — 84550 ASSAY OF BLOOD/URIC ACID: CPT

## 2024-04-09 PROCEDURE — 80053 COMPREHEN METABOLIC PANEL: CPT

## 2024-04-09 PROCEDURE — 80061 LIPID PANEL: CPT

## 2024-04-09 PROCEDURE — 84443 ASSAY THYROID STIM HORMONE: CPT

## 2024-04-09 PROCEDURE — 82550 ASSAY OF CK (CPK): CPT

## 2024-04-18 ENCOUNTER — EXTERNAL HOME HEALTH (OUTPATIENT)
Dept: HOME HEALTH SERVICES | Facility: HOSPITAL | Age: 84
End: 2024-04-18
Payer: MEDICARE

## 2024-08-21 ENCOUNTER — OFFICE VISIT (OUTPATIENT)
Dept: FAMILY MEDICINE | Facility: CLINIC | Age: 84
End: 2024-08-21
Payer: MEDICARE

## 2024-08-21 VITALS
OXYGEN SATURATION: 95 % | HEART RATE: 98 BPM | BODY MASS INDEX: 47.25 KG/M2 | TEMPERATURE: 98 F | RESPIRATION RATE: 16 BRPM | WEIGHT: 219 LBS | DIASTOLIC BLOOD PRESSURE: 76 MMHG | HEIGHT: 57 IN | SYSTOLIC BLOOD PRESSURE: 112 MMHG

## 2024-08-21 DIAGNOSIS — E78.2 MIXED HYPERLIPIDEMIA: Chronic | ICD-10-CM

## 2024-08-21 DIAGNOSIS — M15.9 PRIMARY OSTEOARTHRITIS INVOLVING MULTIPLE JOINTS: ICD-10-CM

## 2024-08-21 DIAGNOSIS — I10 PRIMARY HYPERTENSION: Chronic | ICD-10-CM

## 2024-08-21 DIAGNOSIS — E11.9 NON-INSULIN DEPENDENT TYPE 2 DIABETES MELLITUS: ICD-10-CM

## 2024-08-21 DIAGNOSIS — Z00.00 MEDICARE ANNUAL WELLNESS VISIT, SUBSEQUENT: Primary | ICD-10-CM

## 2024-08-21 DIAGNOSIS — N18.4 CHRONIC KIDNEY DISEASE, STAGE 4 (SEVERE): Chronic | ICD-10-CM

## 2024-08-21 DIAGNOSIS — E05.90 HYPERTHYROIDISM: ICD-10-CM

## 2024-08-21 DIAGNOSIS — B02.29 POST HERPETIC NEURALGIA: ICD-10-CM

## 2024-08-21 PROCEDURE — G0439 PPPS, SUBSEQ VISIT: HCPCS | Mod: ,,, | Performed by: STUDENT IN AN ORGANIZED HEALTH CARE EDUCATION/TRAINING PROGRAM

## 2024-08-21 RX ORDER — GABAPENTIN 300 MG/1
300 CAPSULE ORAL 2 TIMES DAILY PRN
Qty: 60 CAPSULE | Refills: 0 | Status: SHIPPED | OUTPATIENT
Start: 2024-08-21 | End: 2025-08-21

## 2024-08-21 NOTE — ASSESSMENT & PLAN NOTE
- Stable.  - Patient following with Dr. Raymond Castañeda with Endocrinology.   - Medication regimen and management per Endocrinology.

## 2024-08-21 NOTE — ASSESSMENT & PLAN NOTE
- BP well-controlled.  - Continue Norvasc 10mg daily, HCTZ 12.5mg daily PRN, Labetalol 300mg BID, and Zestoretic 10mg-12.5mg daily.

## 2024-08-21 NOTE — ASSESSMENT & PLAN NOTE
- Stable.  - Patient following with Dr. Raymond Castañeda with Endocrinology.  - Methimazole per Endocrinology.

## 2024-08-21 NOTE — PROGRESS NOTES
"Subjective:      Patient ID: Anne Alberts is a 84 y.o.  female. Patient accompanied by her daughter, Mrs. Emma Hector.    Chief Complaint: Medicare Wellness    NIDDMII: Patient following with Dr. Raymond Castañeda with Endocrinology. Patient taking Trulicity on Tuesdays and Prandin BID.     Hyperthyroidism:  Patient following with Dr. Raymond Castañeda with Endocrinology. Patient taking Methimazole.     HTN/HLD: /76. Patient taking Norvasc, HCTZ PRN, Labetalol, Zestoretic, and Pravastatin.     CKDIV: Patient was following with Dr. Jamaal Lloyd with Nephrology. Patient not amenable to starting dialysis at this time.     Chronic Pain/Osteoarthritis: Patient is taking Tramadol prescribed by Dr. Castañeda.    Shingles: Onset x 4 weeks. The lesions have scabbed over. Located to left check and upper back. She still has some nerve pain from it.    Review of Systems   Constitutional:  Negative for activity change, appetite change, chills, diaphoresis, fatigue, fever and unexpected weight change.   Eyes:  Negative for visual disturbance.   Respiratory:  Negative for apnea, cough, shortness of breath, wheezing and stridor.    Cardiovascular:  Positive for leg swelling. Negative for chest pain and palpitations.   Gastrointestinal:  Negative for abdominal pain, blood in stool, constipation, diarrhea, nausea and vomiting.   Genitourinary:  Negative for dysuria and hematuria.   Musculoskeletal:  Positive for arthralgias.   Skin:  Positive for rash (shingles). Negative for wound.   Neurological:  Positive for weakness. Negative for dizziness, syncope, numbness and headaches.   Psychiatric/Behavioral:  Negative for behavioral problems, dysphoric mood and sleep disturbance. The patient is not nervous/anxious.      Objective:   /76 (BP Location: Right forearm, Patient Position: Sitting, BP Method: Large (Automatic))   Pulse 98   Temp 97.9 °F (36.6 °C) (Temporal)   Resp 16   Ht 4' 9" (1.448 m)   Wt 99.3 kg (219 lb)   " SpO2 95%   BMI 47.39 kg/m²     Physical Exam  Vitals and nursing note reviewed.   Constitutional:       General: She is not in acute distress.     Appearance: Normal appearance. She is obese. She is ill-appearing (chronically). She is not toxic-appearing or diaphoretic.   HENT:      Head: Normocephalic and atraumatic.      Mouth/Throat:      Mouth: Mucous membranes are moist.      Pharynx: Oropharynx is clear.   Eyes:      Conjunctiva/sclera: Conjunctivae normal.   Cardiovascular:      Rate and Rhythm: Normal rate and regular rhythm.      Heart sounds: Normal heart sounds. No murmur heard.  Pulmonary:      Effort: Pulmonary effort is normal. No respiratory distress.      Breath sounds: Normal breath sounds.   Abdominal:      General: There is no distension.      Palpations: Abdomen is soft.      Tenderness: There is no abdominal tenderness.   Musculoskeletal:         General: No deformity.      Cervical back: Neck supple. No tenderness.      Right lower leg: Edema present.      Left lower leg: Edema present.      Comments: Patient sitting comfortably in wheelchair.   Lymphadenopathy:      Cervical: No cervical adenopathy.   Skin:     General: Skin is warm and dry.      Findings: Lesion (scabbed over blisters on left upper chest and left upper back) present. No rash.   Neurological:      General: No focal deficit present.      Mental Status: She is alert. Mental status is at baseline.      Motor: Weakness (generalized) present.      Coordination: Coordination normal.   Psychiatric:         Mood and Affect: Mood normal.         Behavior: Behavior normal.         Thought Content: Thought content normal.         Judgment: Judgment normal.       Assessment/Plan:   1. Medicare annual wellness visit, subsequent  Comments:  - Health maintenance reviewed.    2. Non-insulin dependent type 2 diabetes mellitus  Assessment & Plan:  - Stable.  - Patient following with Dr. Raymond Castañeda with Endocrinology.   - Medication regimen  and management per Endocrinology.      3. Hyperthyroidism  Assessment & Plan:  - Stable.  - Patient following with Dr. Raymond Castañeda with Endocrinology.  - Methimazole per Endocrinology.      4. Primary hypertension  Assessment & Plan:  - BP well-controlled.  - Continue Norvasc 10mg daily, HCTZ 12.5mg daily PRN, Labetalol 300mg BID, and Zestoretic 10mg-12.5mg daily.      5. Mixed hyperlipidemia  Assessment & Plan:  - Continue Pravastatin 20mg daily.      6. Chronic kidney disease, stage 4 (severe)  Assessment & Plan:  - Patient was following with Dr. Jamaal Lloyd with Nephrology.  - Patient not amenable to starting dialysis at this time.      7. Primary osteoarthritis involving multiple joints  Assessment & Plan:  - Stable.  - Patient taking Tramadol PRN. She was informed that I would not continue Tramadol for her. She verbalized understanding.  - Patient not amenable to Pain Management referral.  - Patient is getting Tramadol from Dr. Castañeda.      8. Post herpetic neuralgia  -     gabapentin (NEURONTIN) 300 MG capsule; Take 1 capsule (300 mg total) by mouth 2 (two) times daily as needed (nerve pain).  Dispense: 60 capsule; Refill: 0         Opioid Screening: Patient medication list reviewed, patient is taking prescription opioids. Patient is not using additional opioids than prescribed. Patient is not at low risk of substance abuse based on this opioid use history.     Patient Reported Health Risk Assessment       Medicare Annual Wellness and Personalized Prevention Plan:   Fall Risk + Home Safety + Hearing Impairment + Depression Screen + Opioid and Substance Abuse Screening + Cognitive Impairment Screen + Health Risk Assessment all reviewed.     Advance Care Planning   I attest to discussing Advance Care Planning with patient and/or family member.  Education was provided including the importance of the Health Care Power of , Advance Directives, and/or [place] documentation.  The patient expressed  understanding to the importance of this information and discussion.       Follow up in about 6 months (around 2/21/2025) for Chronic Medical Management.

## 2024-08-21 NOTE — ASSESSMENT & PLAN NOTE
- Stable.  - Patient taking Tramadol PRN. She was informed that I would not continue Tramadol for her. She verbalized understanding.  - Patient not amenable to Pain Management referral.  - Patient is getting Tramadol from Dr. Castañeda.

## 2024-08-21 NOTE — ASSESSMENT & PLAN NOTE
- Patient was following with Dr. Jamaal Lloyd with Nephrology.  - Patient not amenable to starting dialysis at this time.

## 2024-09-13 ENCOUNTER — OFFICE VISIT (OUTPATIENT)
Dept: FAMILY MEDICINE | Facility: CLINIC | Age: 84
End: 2024-09-13
Payer: MEDICARE

## 2024-09-13 ENCOUNTER — TELEPHONE (OUTPATIENT)
Dept: FAMILY MEDICINE | Facility: CLINIC | Age: 84
End: 2024-09-13

## 2024-09-13 VITALS
HEIGHT: 57 IN | BODY MASS INDEX: 47.39 KG/M2 | HEART RATE: 90 BPM | OXYGEN SATURATION: 89 % | TEMPERATURE: 98 F | DIASTOLIC BLOOD PRESSURE: 75 MMHG | RESPIRATION RATE: 12 BRPM | SYSTOLIC BLOOD PRESSURE: 166 MMHG

## 2024-09-13 DIAGNOSIS — N18.4 CHRONIC KIDNEY DISEASE, STAGE 4 (SEVERE): Chronic | ICD-10-CM

## 2024-09-13 DIAGNOSIS — A41.9 SEPSIS, DUE TO UNSPECIFIED ORGANISM, UNSPECIFIED WHETHER ACUTE ORGAN DYSFUNCTION PRESENT: ICD-10-CM

## 2024-09-13 DIAGNOSIS — J18.9 PNEUMONIA DUE TO INFECTIOUS ORGANISM, UNSPECIFIED LATERALITY, UNSPECIFIED PART OF LUNG: Primary | ICD-10-CM

## 2024-09-13 DIAGNOSIS — I48.91 ATRIAL FIBRILLATION, UNSPECIFIED TYPE: ICD-10-CM

## 2024-09-13 DIAGNOSIS — R78.81 BACTEREMIA: ICD-10-CM

## 2024-09-13 RX ORDER — AMIODARONE HYDROCHLORIDE 200 MG/1
200 TABLET ORAL 2 TIMES DAILY
COMMUNITY
Start: 2024-09-10 | End: 2024-10-10

## 2024-09-13 RX ORDER — INSULIN DEGLUDEC 100 U/ML
20 INJECTION, SOLUTION SUBCUTANEOUS EVERY EVENING
COMMUNITY

## 2024-09-13 NOTE — TELEPHONE ENCOUNTER
----- Message from Benjamin Estrada sent at 9/13/2024  8:59 AM CDT -----  Who Called: Marley Zamarripa- granddaughter     Caller is requesting assistance/information from provider's office.    Symptoms (please be specific):    How long has patient had these symptoms:    List of preferred pharmacies on file (remove unneeded): [unfilled]  If different, enter pharmacy into here including location and phone number:       Patient's Preferred Phone Number on File:[phone]  Best Call Back Number, if different:  Additional Information: pt granddaughter called to determine if she can email, intake form for Lynn Garcia that needs to be completed at [facility] appt , please follow up

## 2024-09-13 NOTE — ASSESSMENT & PLAN NOTE
- Stable.  - Patient has a follow-up with Cardiology.  - Patient taking Amiodarone, Eliquis, and Labetalol.

## 2024-09-13 NOTE — PROGRESS NOTES
"Subjective:      Patient ID: Anne Alberts is a 84 y.o.  female. Patient accompanied by her son, Mr. Soren Alberts.    Chief Complaint: Hospital Follow-Up    Hospital Follow-Up: Patient admitted at [facility] on 08/29/24 due to pneumonia. She was treated with IV antibiotics inpatient and transitioned to Ciprofloxacin and Doxycyline on discharge. She developed a-fib and Cardiology consulted. Patient was started on Amiodarone, Eliquis, and Labetalol for A-fib.She was also treated for E. Coli bacteremia/sepsis. Repeat blood cultures negative. Nephrology consulted for CARLOS/CKD IV. PT/OT consulted inpatient. She refused SNF placement. She was discharged on 09/10/24. She has follow-ups with Cardiology and Nephrology as well. Patient requesting admission to rehab at [facility] in [place], [place].    Review of Systems   Constitutional:  Positive for fatigue. Negative for activity change, chills, diaphoresis and fever.   Eyes:  Negative for visual disturbance.   Respiratory:  Positive for cough and shortness of breath. Negative for apnea, wheezing and stridor.    Cardiovascular:  Positive for leg swelling. Negative for chest pain.   Gastrointestinal:  Negative for abdominal pain, blood in stool, diarrhea, nausea and vomiting.   Genitourinary:  Negative for dysuria and hematuria.   Musculoskeletal:  Positive for arthralgias.   Skin:  Negative for rash and wound.   Neurological:  Positive for weakness. Negative for dizziness, syncope, numbness and headaches.   Psychiatric/Behavioral:  Negative for behavioral problems, dysphoric mood and sleep disturbance. The patient is not nervous/anxious.      Objective:   BP (!) 166/75 (BP Location: Right forearm, Patient Position: Lying, BP Method: Small (Automatic))   Pulse 90   Temp 98.1 °F (36.7 °C) (Oral)   Resp 12   Ht 4' 9" (1.448 m)   SpO2 (!) 89%   BMI 47.39 kg/m²     Physical Exam  Vitals and nursing note reviewed.   Constitutional:       General: She is not in " acute distress.     Appearance: Normal appearance. She is obese. She is ill-appearing (chronically). She is not toxic-appearing or diaphoretic.   HENT:      Head: Normocephalic and atraumatic.   Eyes:      Conjunctiva/sclera: Conjunctivae normal.   Cardiovascular:      Rate and Rhythm: Normal rate and regular rhythm.      Heart sounds: Normal heart sounds. No murmur heard.  Pulmonary:      Effort: Pulmonary effort is normal. No respiratory distress.      Breath sounds: Normal breath sounds. No stridor. No wheezing or rales.   Abdominal:      General: There is no distension.      Palpations: Abdomen is soft.      Tenderness: There is no abdominal tenderness.   Musculoskeletal:         General: No deformity.      Right lower leg: Edema present.      Left lower leg: Edema present.      Comments: Patient laying comfortably in transport bed.   Skin:     General: Skin is warm and dry.      Findings: No lesion or rash.   Neurological:      General: No focal deficit present.      Mental Status: She is alert.      Motor: Weakness (generalized) present.      Coordination: Coordination normal.   Psychiatric:         Mood and Affect: Mood normal.         Behavior: Behavior normal.         Thought Content: Thought content normal.         Judgment: Judgment normal.       Assessment/Plan:   1. Pneumonia due to infectious organism, unspecified laterality, unspecified part of lung  Comments:  - Hospital records reviewed and summarized in HPI.  - Complete/continue medications from hospital discharge.  - Due to patient deconditioning recommend rehab placement.    2. Sepsis, due to unspecified organism, unspecified whether acute organ dysfunction present     - Refer to PNA plan.    3. Bacteremia     - Refer to PNA plan.    4. Atrial fibrillation, unspecified type  Assessment & Plan:  - Stable.  - Patient has a follow-up with Cardiology.  - Patient taking Amiodarone, Eliquis, and Labetalol.    5. Chronic kidney disease, stage 4  (severe)  Assessment & Plan:  - Stable.  - Patient has a follow-up with Nephrology.                         Adequate

## 2024-09-16 ENCOUNTER — TELEPHONE (OUTPATIENT)
Dept: FAMILY MEDICINE | Facility: CLINIC | Age: 84
End: 2024-09-16
Payer: MEDICARE

## 2024-09-16 NOTE — TELEPHONE ENCOUNTER
----- Message from Magalis Garner sent at 9/16/2024  8:42 AM CDT -----  .Type:  Patient Returning Call    Who Called:Lola  Who Left Message for Patient:pt granddaughter  Does the patient know what this is regarding?:Treatment paper  Would the patient rather a call back or a response via [facility]?   Best Call Back Number:[phone]  Additional Information: Please call back about paper work to have patient admitted to nursing home for treatment

## 2024-09-16 NOTE — TELEPHONE ENCOUNTER
called to inform nurse pt doesn't need to continue , TRULICITY 1.5 mg/0.5 mL pen injector, follow up if needed     KATIANA

## 2024-09-16 NOTE — TELEPHONE ENCOUNTER
Pt's granddaughter notified paperwork has been received and is being completed. Pt's granddaughter verbalized understanding.

## 2024-09-16 NOTE — TELEPHONE ENCOUNTER
----- Message from Benjamin Estrada sent at 9/16/2024  8:12 AM CDT -----  Who Called: Maria Elena  at Dr Castañeda ofc    Caller is requesting assistance/information from provider's office.    Symptoms (please be specific):    How long has patient had these symptoms:    List of preferred pharmacies on file (remove unneeded): [unfilled]  If different, enter pharmacy into here including location and phone number:        Patient's Preferred Phone Number on File: [phone]  Best Call Back Number, if different:  Additional Information: called to inform nurse pt doesn't need to continue , TRULICITY 1.5 mg/0.5 mL pen injector, follow up if needed

## 2024-09-18 ENCOUNTER — TELEPHONE (OUTPATIENT)
Dept: FAMILY MEDICINE | Facility: CLINIC | Age: 84
End: 2024-09-18
Payer: MEDICARE

## 2024-09-18 NOTE — TELEPHONE ENCOUNTER
----- Message from Monae Martinez sent at 9/18/2024 10:30 AM CDT -----  Who Called: Anne Alberts    Caller is requesting assistance/information from provider's office.  Preferred Method of Contact: Phone Call  Patient's Preferred Phone Number on File: [phone]   Best Call Back Number, if different:  Additional Information:  medical advice, Lorena [phone] [facility] (arriving today 9/18/24) calling to request a eRx for pt medication  (traMADoL (ULTRAM) 50 mg tablet) needs to be sent to [facility] pharmacy for long term care in [place] [phone] or fax#  [phone], please advise, thanks

## 2024-09-18 NOTE — TELEPHONE ENCOUNTER
Actually please have patient take calcium 1200mg + vitamin D 800 IU daily. She will need to get vitamin D level redrawn.

## 2024-09-18 NOTE — TELEPHONE ENCOUNTER
medical advice, Lorena [phone] [facility] (arriving today 9/18/24) calling to request a eRx for pt medication  (traMADoL (ULTRAM) 50 mg tablet) needs to be sent to [facility] pharmacy for long term care in [place] [phone] or fax#  [phone], please advise, thanks

## 2024-09-18 NOTE — TELEPHONE ENCOUNTER
Patient was getting Tramadol prescribed by Dr. Raymond Castañeda. I will not continue this medication for the patient.

## 2024-11-27 DIAGNOSIS — I82.622 THROMBOSIS OF LEFT BRACHIAL VEIN: Primary | ICD-10-CM

## 2024-11-29 ENCOUNTER — HOSPITAL ENCOUNTER (OUTPATIENT)
Dept: CARDIOLOGY | Facility: HOSPITAL | Age: 84
Discharge: HOME | End: 2024-11-29
Attending: STUDENT IN AN ORGANIZED HEALTH CARE EDUCATION/TRAINING PROGRAM
Payer: MEDICARE

## 2024-11-29 DIAGNOSIS — I82.622 THROMBOSIS OF LEFT BRACHIAL VEIN: ICD-10-CM

## 2024-11-29 PROCEDURE — 93971 EXTREMITY STUDY: CPT | Mod: 26,LT,, | Performed by: SURGERY

## 2024-11-29 PROCEDURE — 93971 EXTREMITY STUDY: CPT | Mod: LT

## 2024-12-03 LAB — HBA1C MFR BLD: 6.6 % (ref 4–6)

## 2024-12-04 ENCOUNTER — PATIENT MESSAGE (OUTPATIENT)
Facility: CLINIC | Age: 84
End: 2024-12-04
Payer: MEDICARE

## 2024-12-12 ENCOUNTER — TELEPHONE (OUTPATIENT)
Dept: FAMILY MEDICINE | Facility: CLINIC | Age: 84
End: 2024-12-12
Payer: MEDICARE

## 2024-12-12 NOTE — TELEPHONE ENCOUNTER
----- Message from Jessica sent at 12/12/2024 11:08 AM CST -----  Regarding: home health  .Type:  Needs Medical Advice    Who Called: pt grand daughter  Symptoms (please be specific):    How long has patient had these symptoms:    Pharmacy name and phone #:    Would the patient rather a call back or a response via [facility]? cb  Best Call Back Number: Jesenia Alberts  Grandchild  [phone]    Additional Information: home health is releasing pt from care - pt would like time extended

## 2024-12-13 ENCOUNTER — TELEPHONE (OUTPATIENT)
Dept: FAMILY MEDICINE | Facility: CLINIC | Age: 84
End: 2024-12-13
Payer: MEDICARE

## 2024-12-13 NOTE — TELEPHONE ENCOUNTER
----- Message from Benjamin sent at 12/13/2024 11:34 AM CST -----  Who Called: Jesenia- grandchild    Caller is requesting assistance/information from provider's office.    Symptoms (please be specific):    How long has patient had these symptoms:    List of preferred pharmacies on file (remove unneeded): [unfilled]  If different, enter pharmacy into here including location and phone number:      Preferred Method of Contact: Phone Call  Patient's Preferred Phone Number on File:   Best Call Back Number, if different:[phone]    Additional Information: pt granddaughter  called to follow up on msg sent on 12/12, pt is in a rehab facility , pt will be release 12/23 , granddaughter wants to discuss home health referral

## 2025-01-24 ENCOUNTER — DOCUMENTATION ONLY (OUTPATIENT)
Dept: FAMILY MEDICINE | Facility: CLINIC | Age: 85
End: 2025-01-24
Payer: MEDICARE

## 2025-02-27 ENCOUNTER — LAB VISIT (OUTPATIENT)
Dept: LAB | Facility: HOSPITAL | Age: 85
End: 2025-02-27
Attending: INTERNAL MEDICINE
Payer: MEDICARE

## 2025-02-27 DIAGNOSIS — N18.4 CHRONIC KIDNEY DISEASE, STAGE 4 (SEVERE): ICD-10-CM

## 2025-02-27 DIAGNOSIS — E55.9 VITAMIN D DEFICIENCY, UNSPECIFIED: ICD-10-CM

## 2025-02-27 DIAGNOSIS — E78.00 PURE HYPERCHOLESTEROLEMIA, UNSPECIFIED: ICD-10-CM

## 2025-02-27 DIAGNOSIS — E78.5 HYPERLIPIDEMIA, UNSPECIFIED HYPERLIPIDEMIA TYPE: Primary | ICD-10-CM

## 2025-02-27 DIAGNOSIS — R05.9 COUGH, UNSPECIFIED TYPE: ICD-10-CM

## 2025-02-27 DIAGNOSIS — N18.4 CHRONIC KIDNEY DISEASE, STAGE IV (SEVERE): ICD-10-CM

## 2025-02-27 DIAGNOSIS — I10 ESSENTIAL (PRIMARY) HYPERTENSION: ICD-10-CM

## 2025-02-27 DIAGNOSIS — N18.6 TYPE 2 DIABETES MELLITUS WITH END-STAGE RENAL DISEASE: ICD-10-CM

## 2025-02-27 DIAGNOSIS — I12.9 HYPERTENSIVE NEPHROPATHY: ICD-10-CM

## 2025-02-27 DIAGNOSIS — E11.9 TYPE 2 DIABETES MELLITUS WITHOUT COMPLICATIONS: ICD-10-CM

## 2025-02-27 DIAGNOSIS — E11.22 TYPE 2 DIABETES MELLITUS WITH END-STAGE RENAL DISEASE: ICD-10-CM

## 2025-02-27 DIAGNOSIS — E05.00 THYROTOXICOSIS WITH DIFFUSE GOITER WITHOUT THYROTOXIC CRISIS OR STORM: ICD-10-CM

## 2025-02-27 LAB
ALBUMIN SERPL-MCNC: 3.7 G/DL (ref 3.4–4.8)
ALBUMIN/GLOB SERPL: 1.3 RATIO (ref 1.1–2)
ALP SERPL-CCNC: 80 UNIT/L (ref 40–150)
ALT SERPL-CCNC: 34 UNIT/L (ref 0–55)
ANION GAP SERPL CALC-SCNC: 10 MEQ/L
AST SERPL-CCNC: 22 UNIT/L (ref 5–34)
BASOPHILS # BLD AUTO: 0.01 X10(3)/MCL
BASOPHILS NFR BLD AUTO: 0.2 %
BILIRUB SERPL-MCNC: 0.5 MG/DL
BUN SERPL-MCNC: 26.1 MG/DL (ref 9.8–20.1)
CALCIUM SERPL-MCNC: 9.1 MG/DL (ref 8.4–10.2)
CHLORIDE SERPL-SCNC: 113 MMOL/L (ref 98–107)
CO2 SERPL-SCNC: 20 MMOL/L (ref 23–31)
CREAT SERPL-MCNC: 1.94 MG/DL (ref 0.55–1.02)
CREAT/UREA NIT SERPL: 13
EOSINOPHIL # BLD AUTO: 0.09 X10(3)/MCL (ref 0–0.9)
EOSINOPHIL NFR BLD AUTO: 1.6 %
ERYTHROCYTE [DISTWIDTH] IN BLOOD BY AUTOMATED COUNT: 16.9 % (ref 11.5–17)
GFR SERPLBLD CREATININE-BSD FMLA CKD-EPI: 25 ML/MIN/1.73/M2
GLOBULIN SER-MCNC: 2.8 GM/DL (ref 2.4–3.5)
GLUCOSE SERPL-MCNC: 130 MG/DL (ref 82–115)
HCT VFR BLD AUTO: 34.4 % (ref 37–47)
HGB BLD-MCNC: 11.2 G/DL (ref 12–16)
IMM GRANULOCYTES # BLD AUTO: 0.03 X10(3)/MCL (ref 0–0.04)
IMM GRANULOCYTES NFR BLD AUTO: 0.5 %
LYMPHOCYTES # BLD AUTO: 0.84 X10(3)/MCL (ref 0.6–4.6)
LYMPHOCYTES NFR BLD AUTO: 15.1 %
MCH RBC QN AUTO: 29.8 PG (ref 27–31)
MCHC RBC AUTO-ENTMCNC: 32.6 G/DL (ref 33–36)
MCV RBC AUTO: 91.5 FL (ref 80–94)
MONOCYTES # BLD AUTO: 0.45 X10(3)/MCL (ref 0.1–1.3)
MONOCYTES NFR BLD AUTO: 8.1 %
NEUTROPHILS # BLD AUTO: 4.16 X10(3)/MCL (ref 2.1–9.2)
NEUTROPHILS NFR BLD AUTO: 74.5 %
NRBC BLD AUTO-RTO: 0 %
PLATELET # BLD AUTO: 157 X10(3)/MCL (ref 130–400)
PMV BLD AUTO: 11.6 FL (ref 7.4–10.4)
POTASSIUM SERPL-SCNC: 5.1 MMOL/L (ref 3.5–5.1)
PROT SERPL-MCNC: 6.5 GM/DL (ref 5.8–7.6)
RBC # BLD AUTO: 3.76 X10(6)/MCL (ref 4.2–5.4)
SODIUM SERPL-SCNC: 143 MMOL/L (ref 136–145)
WBC # BLD AUTO: 5.58 X10(3)/MCL (ref 4.5–11.5)

## 2025-02-27 PROCEDURE — 85025 COMPLETE CBC W/AUTO DIFF WBC: CPT

## 2025-02-27 PROCEDURE — 80053 COMPREHEN METABOLIC PANEL: CPT

## 2025-02-27 PROCEDURE — 36415 COLL VENOUS BLD VENIPUNCTURE: CPT

## 2025-05-22 DIAGNOSIS — M25.531 RIGHT WRIST PAIN: Primary | ICD-10-CM

## 2025-06-04 ENCOUNTER — HOSPITAL ENCOUNTER (OUTPATIENT)
Dept: RADIOLOGY | Facility: CLINIC | Age: 85
Discharge: HOME | End: 2025-06-04
Attending: REHABILITATION UNIT
Payer: MEDICARE

## 2025-06-04 ENCOUNTER — OFFICE VISIT (OUTPATIENT)
Dept: ORTHOPEDICS | Facility: CLINIC | Age: 85
End: 2025-06-04
Payer: MEDICARE

## 2025-06-04 VITALS
SYSTOLIC BLOOD PRESSURE: 112 MMHG | HEART RATE: 60 BPM | HEIGHT: 57 IN | BODY MASS INDEX: 47.39 KG/M2 | DIASTOLIC BLOOD PRESSURE: 62 MMHG

## 2025-06-04 DIAGNOSIS — M25.531 RIGHT WRIST PAIN: ICD-10-CM

## 2025-06-04 DIAGNOSIS — Q74.0: Primary | ICD-10-CM

## 2025-06-04 PROCEDURE — 73110 X-RAY EXAM OF WRIST: CPT | Mod: RT,,, | Performed by: REHABILITATION UNIT

## 2025-06-04 PROCEDURE — 99203 OFFICE O/P NEW LOW 30 MIN: CPT | Mod: ,,, | Performed by: REHABILITATION UNIT

## 2025-08-11 ENCOUNTER — LAB REQUISITION (OUTPATIENT)
Dept: LAB | Facility: HOSPITAL | Age: 85
End: 2025-08-11
Payer: MEDICARE

## 2025-08-11 DIAGNOSIS — E11.9 TYPE 2 DIABETES MELLITUS WITHOUT COMPLICATIONS: ICD-10-CM

## 2025-08-11 LAB
EST. AVERAGE GLUCOSE BLD GHB EST-MCNC: 119.8 MG/DL
HBA1C MFR BLD: 5.8 %

## 2025-08-11 PROCEDURE — 83036 HEMOGLOBIN GLYCOSYLATED A1C: CPT | Performed by: INTERNAL MEDICINE

## 2025-08-27 ENCOUNTER — LAB REQUISITION (OUTPATIENT)
Dept: LAB | Facility: HOSPITAL | Age: 85
End: 2025-08-27
Payer: MEDICARE

## 2025-08-27 DIAGNOSIS — R79.9 ABNORMAL FINDING OF BLOOD CHEMISTRY, UNSPECIFIED: ICD-10-CM

## 2025-08-27 LAB
ALBUMIN SERPL-MCNC: 2.4 G/DL (ref 3.4–4.8)
ALBUMIN/GLOB SERPL: 1 RATIO (ref 1.1–2)
ALP SERPL-CCNC: 134 UNIT/L (ref 40–150)
ALT SERPL-CCNC: 78 UNIT/L (ref 0–55)
ANION GAP SERPL CALC-SCNC: 11 MEQ/L
AST SERPL-CCNC: 58 UNIT/L (ref 11–45)
BASOPHILS # BLD AUTO: 0.03 X10(3)/MCL
BASOPHILS NFR BLD AUTO: 0.4 %
BILIRUB SERPL-MCNC: 0.3 MG/DL
BUN SERPL-MCNC: 25 MG/DL (ref 9.8–20.1)
CALCIUM SERPL-MCNC: 8.2 MG/DL (ref 8.4–10.2)
CHLORIDE SERPL-SCNC: 102 MMOL/L (ref 98–107)
CO2 SERPL-SCNC: 26 MMOL/L (ref 23–31)
CREAT SERPL-MCNC: 2.81 MG/DL (ref 0.55–1.02)
CREAT/UREA NIT SERPL: 9
EOSINOPHIL # BLD AUTO: 0.08 X10(3)/MCL (ref 0–0.9)
EOSINOPHIL NFR BLD AUTO: 1.1 %
ERYTHROCYTE [DISTWIDTH] IN BLOOD BY AUTOMATED COUNT: 18.4 % (ref 11.5–17)
EST. AVERAGE GLUCOSE BLD GHB EST-MCNC: 122.6 MG/DL
GFR SERPLBLD CREATININE-BSD FMLA CKD-EPI: 16 ML/MIN/1.73/M2
GLOBULIN SER-MCNC: 2.3 GM/DL (ref 2.4–3.5)
GLUCOSE SERPL-MCNC: 92 MG/DL (ref 82–115)
HBA1C MFR BLD: 5.9 %
HCT VFR BLD AUTO: 28.7 % (ref 37–47)
HGB BLD-MCNC: 9.3 G/DL (ref 12–16)
IMM GRANULOCYTES # BLD AUTO: 0.13 X10(3)/MCL (ref 0–0.04)
IMM GRANULOCYTES NFR BLD AUTO: 1.7 %
LYMPHOCYTES # BLD AUTO: 1.77 X10(3)/MCL (ref 0.6–4.6)
LYMPHOCYTES NFR BLD AUTO: 23.8 %
MCH RBC QN AUTO: 28.3 PG (ref 27–31)
MCHC RBC AUTO-ENTMCNC: 32.4 G/DL (ref 33–36)
MCV RBC AUTO: 87.2 FL (ref 80–94)
MONOCYTES # BLD AUTO: 0.82 X10(3)/MCL (ref 0.1–1.3)
MONOCYTES NFR BLD AUTO: 11 %
NEUTROPHILS # BLD AUTO: 4.6 X10(3)/MCL (ref 2.1–9.2)
NEUTROPHILS NFR BLD AUTO: 62 %
NRBC BLD AUTO-RTO: 0 %
PLATELET # BLD AUTO: 264 X10(3)/MCL (ref 130–400)
PMV BLD AUTO: 10.2 FL (ref 7.4–10.4)
POTASSIUM SERPL-SCNC: 4.5 MMOL/L (ref 3.5–5.1)
PROT SERPL-MCNC: 4.7 GM/DL (ref 5.8–7.6)
RBC # BLD AUTO: 3.29 X10(6)/MCL (ref 4.2–5.4)
SODIUM SERPL-SCNC: 139 MMOL/L (ref 136–145)
WBC # BLD AUTO: 7.43 X10(3)/MCL (ref 4.5–11.5)

## 2025-08-27 PROCEDURE — 80053 COMPREHEN METABOLIC PANEL: CPT | Performed by: NURSE PRACTITIONER

## 2025-08-27 PROCEDURE — 85025 COMPLETE CBC W/AUTO DIFF WBC: CPT | Performed by: NURSE PRACTITIONER

## 2025-08-27 PROCEDURE — 83036 HEMOGLOBIN GLYCOSYLATED A1C: CPT | Performed by: NURSE PRACTITIONER

## 2025-09-02 DIAGNOSIS — K21.9 GERD (GASTROESOPHAGEAL REFLUX DISEASE): Primary | ICD-10-CM

## 2025-09-02 DIAGNOSIS — R11.0 NAUSEA: ICD-10-CM

## 2025-09-02 DIAGNOSIS — R63.4 UNINTENTIONAL WEIGHT LOSS: ICD-10-CM

## 2025-09-02 DIAGNOSIS — R19.4 CHANGE IN BOWEL HABITS: ICD-10-CM

## 2025-09-02 DIAGNOSIS — R19.7 DIARRHEA: ICD-10-CM
